# Patient Record
Sex: FEMALE | Race: WHITE | Employment: OTHER | URBAN - METROPOLITAN AREA
[De-identification: names, ages, dates, MRNs, and addresses within clinical notes are randomized per-mention and may not be internally consistent; named-entity substitution may affect disease eponyms.]

---

## 2017-08-23 ENCOUNTER — HOSPITAL ENCOUNTER (OUTPATIENT)
Dept: MAMMOGRAPHY | Facility: CLINIC | Age: 67
Discharge: HOME | End: 2017-08-23
Payer: MEDICARE

## 2017-08-23 ENCOUNTER — GENERIC CONVERSION - ENCOUNTER (OUTPATIENT)
Dept: OTHER | Facility: OTHER | Age: 67
End: 2017-08-23

## 2017-08-23 DIAGNOSIS — R92.8 OTHER ABNORMAL AND INCONCLUSIVE FINDINGS ON DIAGNOSTIC IMAGING OF BREAST: ICD-10-CM

## 2017-08-23 PROCEDURE — G0206 DX MAMMO INCL CAD UNI: HCPCS

## 2017-11-23 DIAGNOSIS — R92.8 OTHER ABNORMAL AND INCONCLUSIVE FINDINGS ON DIAGNOSTIC IMAGING OF BREAST: ICD-10-CM

## 2018-01-11 NOTE — RESULT NOTES
Verified Results  (Q) LIPID PANEL WITH DIRECT LDL [id number] 08:23AM [employer]     Test Name Result Flag Reference   CHOLESTEROL, TOTAL 291 mg/dL H 125-200   HDL CHOLESTEROL 49 mg/dL  > OR = 46   TRIGLICERIDES 275 mg/dL H <150   DIRECT  mg/dL H <130   Desirable range <100 mg/dL for patients with CHD or  diabetes and <70 mg/dL for diabetic patients with  known heart disease  CHOL/HDLC RATIO 5 9 (calc) H < OR = 5 0   NON HDL CHOLESTEROL 242 mg/dL (calc) H    Target for non-HDL cholesterol is 30 mg/dL higher than   LDL cholesterol target  (Q) COMPREHENSIVE METABOLIC PANEL W/O eGFR [id number] 08:23AM [employer]     Test Name Result Flag Reference   GLUCOSE 89 mg/dL  65-99   Fasting reference interval   UREA NITROGEN (BUN) 15 mg/dL  7-25   CREATININE 0 63 mg/dL  0 50-0 99   For patients >52years of age, the reference limit  for Creatinine is approximately 13% higher for people  identified as -American     BUN/CREATININE RATIO   1-20   NOT APPLICABLE (calc)   SODIUM 138 mmol/L  135-146   POTASSIUM 4 2 mmol/L  3 5-5 3   CHLORIDE 103 mmol/L     CARBON DIOXIDE 25 mmol/L  20-31   CALCIUM 9 6 mg/dL  8 6-10 4   PROTEIN, TOTAL 6 8 g/dL  6 1-8 1   ALBUMIN 4 2 g/dL  3 6-5 1   GLOBULIN 2 6 g/dL (calc)  1 9-3 7   ALBUMIN/GLOBULIN RATIO 1 6 (calc)  1 0-2 5   BILIRUBIN, TOTAL 0 4 mg/dL  0 2-1 2   ALKALINE PHOSPHATASE 112 U/L     AST 25 U/L  10-35   ALT 31 U/L H 6-29     (Q) TSH, 3RD GENERATION [id number] 08:23AM [employer]     Test Name Result Flag Reference   TSH 1 36 mIU/L  0 40-4 50     *(Q) VITAMIN D, 25-HYDROXY, LC/MS/MS [id number] 08:23AM [employer]   REPORT COMMENT:  FASTING:YES     Test Name Result Flag Reference   VITAMIN D, 25-OH, TOTAL 34 ng/mL     Vitamin D Status         25-OH Vitamin D:     Deficiency:                    <20 ng/mL  Insufficiency:             20 - 29 ng/mL  Optimal:                 > or = 30 ng/mL     For 25-OH Vitamin D testing on patients on D2-supplementation and patients for whom quantitation   of D2 and D3 fractions is required, the QuestAssureD(TM)  25-OH VIT D, (D2,D3), LC/MS/MS is recommended: order   code 45211 (patients >2yrs)  For more information on this test, go to:  [email or web address]/VZZ909  (This link is being provided for   informational/educational purposes only )

## 2018-01-11 NOTE — MISCELLANEOUS
Assessment    1  Ovary, torsion (620 5) (N83 51)   2  Ovarian mass (620 9) (N83 9)    Discussion/Summary  Discussion Summary:   She is recovering well from torsed ovary/ ovarian mass w removal - path benign Saw Gyn yesterday  Use meds as is  do TSH/ lipid BW when can   Call us as needed    Also - do mammo as prev discussed  Chief Complaint  Chief Complaint Free Text Note Form: ENRIQUETA- Ovarian Mass w torsion 7/21/16      History of Present Illness  TCM Communication [facility]: The patient is being contacted for follow-up after hospitalization  Hospital records were not available  She was hospitalized [facility]  The date of admission: 7/21/2016, date of discharge: 7/22/2016  Diagnosis: OVARIAN MASS, LAPAROSCOPIC SURGERY  She was discharged to home  Medications were not reviewed today  Follow-up appointments with other specialists: GYN  Symptoms: constipation and incisional pain  minor incisional pain   Communication performed and completed by EL GOLD [facility] RN   HPI: See hospital notes torsed right ovary w removal - did well - Feeling well - long term pain right low back actually resolved    Meds same  Never did BW from initial visit - has slip also never did Mammo - has slip      Review of Systems  Complete-Female:   Constitutional: as noted in HPI and no fever  Cardiovascular: No complaints of slow heart rate, no fast heart rate, no chest pain, no palpitations, no leg claudication, no lower extremity edema  Respiratory: No complaints of shortness of breath, no wheezing, no cough, no SOB on exertion, no orthopnea, no PND  Gastrointestinal: bowels ok, but as noted in HPI, no nausea, no vomiting and no blood in stools  Genitourinary: no dysuria  Neurological: no headache, no confusion, no dizziness and no fainting  Psychiatric: no anxiety and no depression  Hematologic/Lymphatic: No complaints of swollen glands, no swollen glands in the neck, does not bleed easily, does not bruise easily        Active Problems    1  Colon polyp (211 3) (K63 5)   2  Encounter for screening mammogram for breast cancer (V76 12) (Z12 31)   3  Hypercholesterolemia (272 0) (E78 0)   4  Hypothyroidism (244 9) (E03 9)   5  History of Total Hip Replacement Right   6  Vitamin D deficiency (268 9) (E55 9)    Surgical History    1  History of Complete Colonoscopy   2  History of Salpingo-oophorectomy   3  History of Total Hip Replacement Right   4  History of Tubal Ligation  Surgical History Reviewed: The surgical history was reviewed and updated today  Family History  Father    1  Family history of lung cancer (V16 1) (Z80 1)  Brother    2  Family history of leukemia (V16 6) (Z80 6)  Maternal Grandmother    3  Family history of Anxiety and depression  Paternal Grandfather    4  Family history of cardiac disorder (V17 49) (Z82 49)   5  Family history of cerebrovascular accident (CVA) (V17 1) (Z82 3)   6  Family history of hypertension (V17 49) (Z82 49)   7  Family history of substance abuse (V17 0) (Z81 4)    Social History    · Former smoker (V15 82) (V59 302)   · Full-time employment   ·    · No drug use   · Social drinker    Current Meds   1  Fish Oil 1200 MG Oral Capsule; Therapy: (Recorded:23Jun2016) to Recorded   2  Levothyroxine Sodium 50 MCG Oral Tablet; Take 1 tablet daily  Requested for:   [id number]; Last Rx:23Jun2016 Ordered   3  Multivitamins TABS; Therapy: (Recorded:23Jun2016) to Recorded   4  Vitamin D-3 1000 UNIT Oral Capsule; Uses 3 a day; Therapy: (Recorded:23Jun2016) to Recorded    Allergies    1  No Known Drug Allergies    Physical Exam    Constitutional   General appearance: No acute distress, well appearing and well nourished  Eyes   Conjunctiva and lids: No swelling, erythema or discharge  Pulmonary   Auscultation of lungs: Clear to auscultation  Cardiovascular   Auscultation of heart: Normal rate and rhythm, normal S1 and S2, without murmurs      Examination of extremities for edema and/or varicosities: Normal     Carotid pulses: Normal     Lymphatic   Palpation of lymph nodes in neck: No lymphadenopathy      Psychiatric   Orientation to person, place, and time: Normal     Mood and affect: Normal          Future Appointments    Date/Time Provider Specialty Site   09/06/2016 09:40 AM Lawson Jim MD Gastroenterology Adult [place] [address]   Electronically signed by : Davide Curiel, ; Jul 25 2016  4:55PM EST                       (Author)    Electronically signed by : Jose Morel, ; Jul 26 2016 10:23AM EST                       (Author)    Electronically signed by : Yudith Sampson DO; Aug  5 2016  2:03PM EST                       (Author)

## 2018-01-12 NOTE — RESULT NOTES
Verified Results  MAMMO DIAGNOSTIC LEFT W CAD [id number] 11:26AM Ana Alston Order Number: [id number]    - Patient Instructions: To schedule this appointment, please contact Central Scheduling at [phone]  Do not wear any perfume, powder, lotion or deodorant on breast or underarm area  Please bring your doctors order, referral (if needed) and insurance information with you on the day of the test  Failure to bring this information may result in this test being rescheduled  Arrive 15 minutes prior to your appointment time to register  On the day of your test, please bring any prior mammogram or breast studies with you that were not performed at a [facility]  Failure to bring prior exams may result in your test needing to be rescheduled  Test Name Result Flag Reference   MAMMO DIAGNOSTIC LEFT W CAD (Report)     Patient History:   Patient is postmenopausal    No known family history of cancer  Benign excisional biopsy of the right breast, 1994  Patient is a former smoker  Patient's BMI is 33 1  Reason for exam: additional evaluation requested from abnormal    screening  Scattered calcifications in the left breast     Mammo Diagnostic Left W CAD: November 18, 2016 - Check In #:    [de-identified]   CC with magnification, ML with magnification, and ML view(s) were   taken of the left breast      Technologist: RT Jhon(CRISPIN)(M)   Prior study comparison: October 19, 2016, bilateral screening    mammogram, performed at [facility]  There are scattered fibroglandular densities  These images were    obtained using digital technique and with the assistance of    Computer Aided Detection  There are no dominant masses, foci of    architectural distortion or suspicious clusters of calcification    to suggest malignancy  The visualized skin appears normal     Previously described calcifications are loosely clustered and    generally benign in morphology       Six-month magnification follow-up is recommended to assess    stability  ASSESSMENT: BiRad:3 - Probably Benign     Recommendation:   Follow-up diagnostic mammogram of the left breast in 6 months  Analyzed by CAD     8-10% of cancers will be missed on mammography  Management of a    palpable abnormality must be based on clinical grounds  Patients   will be notified of their results via letter from our facility  Accredited by [employer] and FDA       Transcription Location: [facility] 98: [id number]     Risk Value(s):   Tyrer-Cuzick 10 Year: 2 987%, Tyrer-Cuzipham Lifetime: 6 480%,    Myriad Table: 1 5%, MAURICE 5 Year: 1 7%, NCI Lifetime: 6 7%   Signed by:   Verlin Felty, MD   11/18/16

## 2018-01-14 NOTE — PROGRESS NOTES
Assessment    1  Encounter for preventive health examination (V70 0) (Z00 00)    Plan  Colon polyp    · COLONOSCOPY; Status:Active; Requested VVP:[id number];   Colon polyp, PMH: Complete Colonoscopy    · *1 - [facility] [place] Physician Referral  Consult   Status: Active - Retrospective Authorization  Requested for: [id number]  Care Summary provided  : Yes  Encounter for screening mammogram for breast cancer    · * MAMMO SCREENING BILATERAL W CAD; Status:Hold For - Scheduling; Requested  for:23Jun2016; Health Maintenance    · Jose Roberto WELCH, Edin Goel  (Dermatology) Physician Referral  Consult  Status: Hold For -  Scheduling  Requested for: [id number]  Care Summary provided  : Yes  Hypercholesterolemia    · (Q) COMPREHENSIVE METABOLIC PANEL W/O eGFR; Status:Active; Requested  CDX:[id number];    · (Q) LIPID PANEL WITH DIRECT LDL; Status:Active; Requested KDH:[id number];   Hypothyroidism    · Levothyroxine Sodium 50 MCG Oral Tablet; Take 1 tablet daily   · (Q) TSH, 3RD GENERATION; Status:Active; Requested JCU:[id number];    · *(Q) VITAMIN D, 25-HYDROXY, LC/MS/MS; Status:Active; Requested LMW:[id number];     Discussion/Summary  health maintenance visit the risks and benefits of cervical cancer screening were discussed set up w gyn Breast cancer screening: the risks and benefits of breast cancer screening were discussed and mammogram has been ordered  Colorectal cancer screening: the risks and benefits of colorectal cancer screening were discussed and colonoscopy has been ordered  Osteoporosis screening: heed Vit D prev low - on 3000 a day  Screening lab work includes glucose, lipid profile, thyroid function testing and 25-hydroxyvitamin D  The should do Zostavax (shingles shot) Do Flu shot yearly   She was advised to be evaluated by an optometrist and a dentist  Advice and education were given regarding nutrition, aerobic exercise, weight bearing exercise, vitamin D supplements, cardiovascular risk reduction, sunscreen use, self skin examination, helmet use and seat belt use  We reviewed health hx - she has been feeling well - heed BW - use med as is  Check here at least yearly  Continue w exercise, healthy diet  Chief Complaint  Initial visit/ Annual Exam      History of Present Illness  HPI: in as new pt - relates feels well overall - needs BW (last > 12 mo ago) On thyroid med for years    walks dog twice a day, swims cycles on weekends    Hx R THR - did well other joints ok      Review of Systems    Constitutional: no fever, not feeling poorly, no recent weight gain, no chills, not feeling tired and no recent weight loss  Eyes: no eyesight problems  ENT: no earache, no sore throat and no hoarseness  Cardiovascular: No complaints of slow heart rate, no fast heart rate, no chest pain, no palpitations, no leg claudication, no lower extremity edema  Respiratory: No complaints of shortness of breath, no wheezing, no cough, no SOB on exertion, no orthopnea, no PND  Gastrointestinal: occ loose bowel has had 3 colonoscopies polyps in past last 6 yrs ago occ loose, but no abdominal pain, no nausea, no vomiting and no blood in stools  Genitourinary: due for gyn, but no dysuria  Musculoskeletal: as noted in HPI  Integumentary: no rashes, no skin lesions and no skin wound  Neurological: no headache, no confusion, no dizziness, no limb weakness, no convulsions, no fainting and no difficulty walking  Psychiatric: no anxiety and no depression  Hematologic/Lymphatic: No complaints of swollen glands, no swollen glands in the neck, does not bleed easily, does not bruise easily        Surgical History    · History of Complete Colonoscopy   · History of Total Hip Replacement Right   · History of Tubal Ligation    Family History  Father    · Family history of lung cancer (V16 1) (Z80 1)  Paternal Grandfather    · Family history of cardiac disorder (V17 49) (Z80 55)    Social History    · Former smoker (V15 82) (Z87 891)   · light  quit 12 yrs ago   · Full-time employment   ·    ·    · No drug use   · Social drinker    Current Meds   1  Fish Oil 1200 MG Oral Capsule; Therapy: (Recorded:23Jun2016) to Recorded   2  Levothyroxine Sodium 50 MCG Oral Tablet; Take 1 tablet daily; Therapy: (Recorded:23Jun2016) to Recorded   3  Multivitamins TABS; Therapy: (Recorded:23Jun2016) to Recorded   4  Vitamin D-3 1000 UNIT Oral Capsule; Uses 3 a day; Therapy: (Recorded:23Jun2016) to Recorded    Allergies    1  No Known Drug Allergies    Vitals   Recorded: 10GKI2066 03:04PM   Heart Rate 68   Systolic 863   Diastolic 78   Height 5 ft 0 5 in   Weight 173 lb 4 00 oz   BMI Calculated 33 28   BSA Calculated 1 77     Physical Exam    Constitutional   General appearance: No acute distress, well appearing and well nourished  Head and Face   Head and face: Normal     Palpation of the face and sinuses: No sinus tenderness  Eyes   Conjunctiva and lids: No swelling, erythema or discharge  Ears, Nose, Mouth, and Throat   Otoscopic examination: Tympanic membranes translucent with normal light reflex  Canals patent without erythema  Oropharynx: Normal with no erythema, edema, exudate or lesions  Neck   Neck: Supple, symmetric, trachea midline, no masses  Thyroid: Normal, no thyromegaly  Pulmonary   Auscultation of lungs: Clear to auscultation  Cardiovascular   Auscultation of heart: Normal rate and rhythm, normal S1 and S2, no murmurs  Carotid pulses: 2+ bilaterally  Examination of extremities for edema and/or varicosities: Normal     Abdomen   Abdomen: Non-tender, no masses  Lymphatic   Palpation of lymph nodes in neck: No lymphadenopathy  Musculoskeletal   Gait and station: Normal     Stability: Normal     Muscle strength/tone: Normal     Neurologic   Cortical function: Normal mental status  Coordination: Normal finger to nose and heel to shin      Psychiatric   Judgment and insight: Normal     Orientation to person, place, and time: Normal     Recent and remote memory: Intact      Mood and affect: Normal        Signatures   Electronically signed by : Justo Sandhu DO; Jun 23 2016  4:21PM EST                       (Author)

## 2018-01-17 NOTE — RESULT NOTES
Verified Results  MAMMO DIAGNOSTIC LEFT W CAD [id number] 02:27PM Yoan Prophet Order Number: [id number]    - Patient Instructions: To schedule this appointment, please contact Central Scheduling at [phone]  Do not wear any perfume, powder, lotion or deodorant on breast or underarm area  Please bring your doctors order, referral (if needed) and insurance information with you on the day of the test  Failure to bring this information may result in this test being rescheduled  Arrive 15 minutes prior to your appointment time to register  On the day of your test, please bring any prior mammogram or breast studies with you that were not performed at a [facility]  Failure to bring prior exams may result in your test needing to be rescheduled  Test Name Result Flag Reference   MAMMO DIAGNOSTIC LEFT W CAD (Report)     Patient History:   Patient is postmenopausal    No known family history of cancer  Benign excisional biopsy of the right breast, 1994  Patient is a former smoker  Patient's BMI is 33 1  Reason for exam: follow-up at short interval from prior study  Patient presents for late six-month follow-up evaluation of    probably benign left breast microcalcifications  Mammo Diagnostic Left W CAD: August 23, 2017 - Check In #:    [de-identified]   CC, MLO, CC with magnification, and ML with magnification view(s)   were taken of the left breast      Technologist: Linda Bains RT(R)(M)   Prior study comparison: November 18, 2016, mammo diagnostic left    W CAD, performed at [address] [place] [address]  October 19, 2016, bilateral screening mammogram, [facility]  There are scattered fibroglandular densities  A few scattered    and grouped microcalcifications within the inner left breast are    stable  These do not have any suspicious features  No new    calcifications are seen  No masses are seen  No architectural    distortion   No skin thickening or nipple inversion  No axillary   adenopathy  Stable benign-appearing breast     ACR BI-RADSï¾® Assessments: BiRad:2 - Benign     Recommendation:   Routine screening mammogram of both breasts in 3 months  Back on   normal annual schedule   Analyzed by CAD     The patient is scheduled in a reminder system  8-10% of cancers will be missed on mammography  Management of a    palpable abnormality must be based on clinical grounds  Patients   will be notified of their results via letter from our facility  Accredited by [employer] and FDA       Transcription Location: [facility] 98: [id number]     Risk Value(s):   Tyrer-Cuzick 10 Year: 3 000%, Tyrer-Cuzick Lifetime: 6 200%,    Myriad Table: 1 5%, MAURICE 5 Year: 1 7%, NCI Lifetime: 6 5%   Signed by:   Gavin Coles MD   8/23/17

## 2019-02-27 ENCOUNTER — TELEPHONE (OUTPATIENT)
Dept: FAMILY MEDICINE CLINIC | Facility: CLINIC | Age: 69
End: 2019-02-27

## 2019-02-27 NOTE — TELEPHONE ENCOUNTER
----- Message from Kya High sent at 2/27/2019  2:18 PM EST -----  Regarding: RE: Schedule  Spoke w/ pt - they had an insurance change and had to switch family doctors  pcp removed   ----- Message -----  From: Jimmy Drew  Sent: 2/11/2019  12:24 PM  To:  Mario Villarreal MA  Subject: Schedule                                         Please attempt to call and schedule pt for AWV last seen 8/2016

## 2021-01-26 ENCOUNTER — TRANSCRIBE ORDERS (OUTPATIENT)
Dept: ADMINISTRATIVE | Facility: HOSPITAL | Age: 71
End: 2021-01-26

## 2021-01-26 DIAGNOSIS — Z12.31 SCREENING MAMMOGRAM FOR HIGH-RISK PATIENT: Primary | ICD-10-CM

## 2021-08-06 ENCOUNTER — OFFICE VISIT (OUTPATIENT)
Dept: OBGYN CLINIC | Facility: CLINIC | Age: 71
End: 2021-08-06
Payer: MEDICARE

## 2021-08-06 ENCOUNTER — APPOINTMENT (OUTPATIENT)
Dept: RADIOLOGY | Facility: AMBULARY SURGERY CENTER | Age: 71
End: 2021-08-06
Attending: ORTHOPAEDIC SURGERY
Payer: MEDICARE

## 2021-08-06 VITALS
SYSTOLIC BLOOD PRESSURE: 122 MMHG | BODY MASS INDEX: 33.04 KG/M2 | WEIGHT: 175 LBS | HEART RATE: 76 BPM | DIASTOLIC BLOOD PRESSURE: 78 MMHG | HEIGHT: 61 IN

## 2021-08-06 DIAGNOSIS — M19.171 POST-TRAUMATIC ARTHRITIS OF ANKLE, RIGHT: ICD-10-CM

## 2021-08-06 DIAGNOSIS — M19.171 POST-TRAUMATIC ARTHRITIS OF ANKLE, RIGHT: Primary | ICD-10-CM

## 2021-08-06 DIAGNOSIS — M19.071 OSTEOARTHRITIS OF RIGHT ANKLE, UNSPECIFIED OSTEOARTHRITIS TYPE: ICD-10-CM

## 2021-08-06 PROCEDURE — 73610 X-RAY EXAM OF ANKLE: CPT

## 2021-08-06 PROCEDURE — 99203 OFFICE O/P NEW LOW 30 MIN: CPT | Performed by: ORTHOPAEDIC SURGERY

## 2021-08-06 RX ORDER — CHLORHEXIDINE GLUCONATE 4 G/100ML
SOLUTION TOPICAL DAILY PRN
Status: CANCELLED | OUTPATIENT
Start: 2021-08-06

## 2021-08-06 NOTE — PROGRESS NOTES
GILL Bright  Attending, Orthopaedic Surgery  [facility] [address] [facility]        ORTHOPAEDIC FOOT AND ANKLE CLINIC VISIT     Assessment:     Encounter Diagnoses   Name Primary?  Osteoarthritis of right ankle, unspecified osteoarthritis type     Post-traumatic arthritis of ankle, right Yes              Plan:   · The patient verbalized understanding of exam findings and treatment plan  We engaged in the shared decision-making process and treatment options were discussed at length with the patient  Surgical and conservative management discussed today along with risks and benefits  · Patient has arthritis of her right ankle joint  · We discussed the multiple treatment options for ankle arthritis which involve NSAIDs, bracing, shoe modifications, injections and then either ankle replacement or ankle arthrodesis  · She saw Dr Elis Alston who had been treating her arthritis up to this point and referred her here for ankle replacement assessment  She is not interested in ankle fusion  · She is a good candidate for ankle replacement  We will plan on [place] ankle replacement in the OR at a mutually convenient rafy  · Obtain preoperative labs and medical assessment as directed  · Follow up 3 weeks post op    CONSENT FOR TOTAL ANKLE REPLACEMENT (TAR): Ankle replacement will be pursued and will be the planned procedure but we will also be prepared to perform an ankle and possibly simultaneous hindfoot arthrodesis should the quality of the bone and deformity not lend themselves to ankle replacement surgery  This will be an intraoperative decision  We have discussed that the deformity may warrant realignment of the foot even if ankle replacement can be performed and realignment would involve osteotomies and possible tendon transfers as well as tendo-Achilles lengthening   The patient understands that we may be able to perform any necessary associated procedures simultaneous to ankle replacement surgery but if there is any concern for the safety of the foot or if this is too much surgery at one time then these procedures may need to be performed in a staged manner  Patient understands that there is no guarantee that the surgery will relieve all of their pain and also understands that there will be a course of protected weight-bearing status required which will restrict them from driving and other activities as discussed at today's visit  Patient understands that there is no guarantee that ankle replacement surgery or ankle arthrodesis with or without simultaneous hindfoot arthrodesis will relieve all of his ankle and hindfoot symptoms and allow him to be fully functional  Patient recognizes that there are risks with surgery including bleeding, numbness, nerve irritation, wound complications, infection, continued pain, joint stiffness, malunion, nonunion, anesthetic complications, death, failure of procedure and possible need for further surgery  The patient understands that there is no guarantee that this surgery will relieve all of Her pain and symptoms  Patient understands that there is no guarantee that they will return to full function after the procedure  Patient has provided informed consent for the procedure  History of Present Illness:   Chief Complaint:   Chief Complaint   Patient presents with    Left Ankle - Pain     Nathalie Cosby is a 78 y o  female who is being seen for right ankle pain, patient has a history of several ankle sprains early in her life but denies any previous surgeries on her ankle  Pain is localized to the ankle joint with minimal radiating and described as sharp and moderate to severe  Patient denies numbness, tingling or radicular pain  Denies history of neuropathy  Patient does not smoke, does not have diabetes and does not take blood thinners    Patient denies family history of anesthesia complications and has not had any complications with anesthesia  Pain/symptom timing:  Worse during the day when active  Pain/symptom context:  Worse with activites and work  Pain/symptom modifying factors:  Rest makes better, activities make worse  Pain/symptom associated signs/symptoms: none    Prior treatment   · NSAIDsYes    · Injections No   · Bracing/Orthotics No   · Physical Therapy No     Orthopedic Surgical History:   As below    Past Medical, Surgical and Social History:  Past Medical History:  has a past medical history of Arthritis, Colon polyps, and Disease of thyroid gland  Problem List: does not have any pertinent problems on file  Past Surgical History:  has a past surgical history that includes Total hip arthroplasty (Right); Joint replacement; Tubal ligation; Colonoscopy; Salpingoophorectomy; Colonoscopy (N/A, 12/13/2016); and pr oophorectormy for [place], w/bx (Bilateral, 7/21/2016)  Family History: family history is not on file  Social History:  reports that she quit smoking about 15 years ago  Her smoking use included cigarettes  She has a 6 00 pack-year smoking history  She does not have any smokeless tobacco history on file  She reports current alcohol use  She reports current drug use  Drug: Marijuana  Current Medications: has a current medication list which includes the following prescription(s): bupropion hcl, vitamin d-3, levothyroxine, multivitamin adult, and fish oil  Allergies: has No Known Allergies       Review of Systems:  General- denies fever/chills  HEENT- denies hearing loss or sore throat  Eyes- denies eye pain or visual disturbances, denies red eyes  Respiratory- denies cough or SOB  Cardio- denies chest pain or palpitations  GI- denies abdominal pain  Endocrine- denies urinary frequency  Urinary- denies pain with urination  Musculoskeletal- Negative except noted above  Skin- denies rashes or wounds  Neurological- denies dizziness or headache  Psychiatric- denies anxiety or difficulty concentrating    Physical Exam:   BP 122/78   Pulse 76   Ht 5' 1" (1 549 m)   Wt 79 4 kg (175 lb)   BMI 33 07 kg/m²   General/Constitutional: No apparent distress: well-nourished and well developed  Eyes: normal ocular motion  Cardio: RRR, Normal S1S2, No m/r/g  Lymphatic: No appreciable lymphadenopathy  Respiratory: Non-labored breathing, CTA b/l no w/c/r  Vascular: No edema, swelling or tenderness, except as noted in detailed exam   Integumentary: No impressive skin lesions present, except as noted in detailed exam   Neuro: No ataxia or tremors noted  Psych: Normal mood and affect, oriented to person, place and time  Appropriate affect  Musculoskeletal: Normal, except as noted in detailed exam and in HPI  Examination    Right    Gait Antalgic   Musculoskeletal Tender to palpation at the medial and anterolateral aspects of the ankle    Skin Normal, moderate swelling of the ankle  Nails Normal    Range of Motion  5 degrees dorsiflexion, 40 degrees plantarflexion  Subtalar motion: normal    Stability Stable    Muscle Strength 5/5 tibialis anterior  5/5 gastrocnemius-soleus  5/5 posterior tibialis  4/5 peroneal/eversion strength  5/5 EHL  5/5 FHL    Neurologic Normal    Sensation  Intact to light touch throughout sural, saphenous, superficial peroneal, deep peroneal and medial/lateral plantar nerve distributions  Powers-Arthur 5 07 filament (10g) testing  deferred  Cardiovascular Brisk capillary refill < 2 seconds,intact DP and PT pulses    Special Tests None      Imaging Studies:   6 views of the right ankle were taken, reviewed and interpreted independently that demonstrate end-stage post-traumatic ankle arthritis with anterior subluxation of the talus under the tibia  Reviewed by me personally  Larry Landmark Lachman, MD  Foot & Ankle Surgery   Department [address]      I personally performed the service  Larry Landmark Lachman, MD

## 2021-08-24 ENCOUNTER — TELEPHONE (OUTPATIENT)
Dept: OBGYN CLINIC | Facility: HOSPITAL | Age: 71
End: 2021-08-24

## 2021-08-24 NOTE — TELEPHONE ENCOUNTER
I healthy patient undergoing elective foot and ankle surgery, routine labs are typically not necessary 
Patient is calling to find out why there is no blood work ordered for pre-op  Patient is having sx on 9/13/21  She went to her pcp yesterday & they also wondered       [phone]
None

## 2021-09-07 RX ORDER — IBUPROFEN 400 MG/1
TABLET ORAL EVERY 6 HOURS PRN
COMMUNITY
End: 2021-09-14 | Stop reason: HOSPADM

## 2021-09-07 NOTE — PRE-PROCEDURE INSTRUCTIONS
Pre-Surgery Instructions:   Medication Instructions    buPROPion HCl (WELLBUTRIN PO) Instructed patient per Anesthesia Guidelines  take am of sx    Cholecalciferol (VITAMIN D-3) 1000 UNITS CAPS Instructed patient per Anesthesia Guidelines  hold am of sx    ibuprofen (MOTRIN) 400 mg tablet Instructed patient per Anesthesia Guidelines  hold 3 days preop    levothyroxine 50 mcg tablet Instructed patient per Anesthesia Guidelines  take am of sx    Multiple Vitamins-Minerals (MULTIVITAMIN ADULT) TABS Instructed patient per Anesthesia Guidelines  hold preop    You will receive a phone call from hospital for arrival time  Please call surgeons office if any changes in your condition  Wear easy on/off clothing; consider type of surgery;  Valuables, jewelry, piercing's please keep at home  **COVID-19  education/surgical guidelines      Please: No contact lenses or eye make up, artificial eyelashes    Please bring special knee scooter if overnight  Please secure transportation     Follow pre surgery showering or cleaning instructions as  Reviewed by nurse or surgeons office      Questions answered and concerns addressed

## 2021-09-11 ENCOUNTER — ANESTHESIA EVENT (OUTPATIENT)
Dept: PERIOP | Facility: HOSPITAL | Age: 71
End: 2021-09-11
Payer: MEDICARE

## 2021-09-12 NOTE — ANESTHESIA PREPROCEDURE EVALUATION
Procedure:  ARTHROPLASTY ANKLE (Right Ankle)    Relevant Problems   Other   (+) Disease of thyroid gland      BMI 33  Arthritis  Depression,anxiety          Physical Exam    Airway    Mallampati score: II  TM Distance: >3 FB  Neck ROM: full     Dental   No notable dental hx     Cardiovascular      Pulmonary      Other Findings        Anesthesia Plan  ASA Score- 2     Anesthesia Type- general with ASA Monitors  Additional Monitors:   Airway Plan: ETT  Comment: GA with ETT, IV, antiemetics  Right popliteal/adductor canal blocks for postop pain control as requested by surgeon  Plan Factors-    Chart reviewed  Patient summary reviewed  Patient is not a current smoker  Patient did not smoke on day of surgery  Induction- intravenous  Postoperative Plan-   Planned trial extubation    Informed Consent- Anesthetic plan and risks discussed with patient  I personally reviewed this patient with the CRNA  Discussed and agreed on the Anesthesia Plan with the CRNA  Lauren Cohen

## 2021-09-13 ENCOUNTER — APPOINTMENT (OUTPATIENT)
Dept: RADIOLOGY | Facility: HOSPITAL | Age: 71
End: 2021-09-13
Payer: MEDICARE

## 2021-09-13 ENCOUNTER — HOSPITAL ENCOUNTER (OUTPATIENT)
Facility: HOSPITAL | Age: 71
Setting detail: OUTPATIENT SURGERY
Discharge: HOME | End: 2021-09-14
Attending: ORTHOPAEDIC SURGERY | Admitting: ORTHOPAEDIC SURGERY
Payer: MEDICARE

## 2021-09-13 ENCOUNTER — ANESTHESIA (OUTPATIENT)
Dept: PERIOP | Facility: HOSPITAL | Age: 71
End: 2021-09-13
Payer: MEDICARE

## 2021-09-13 DIAGNOSIS — M19.171 POST-TRAUMATIC ARTHRITIS OF RIGHT ANKLE: Primary | ICD-10-CM

## 2021-09-13 PROCEDURE — G9197 ORDER FOR CEPH: HCPCS | Performed by: ORTHOPAEDIC SURGERY

## 2021-09-13 PROCEDURE — C1713 ANCHOR/SCREW BN/BN,TIS/BN: HCPCS | Performed by: ORTHOPAEDIC SURGERY

## 2021-09-13 PROCEDURE — C1776 JOINT DEVICE (IMPLANTABLE): HCPCS | Performed by: ORTHOPAEDIC SURGERY

## 2021-09-13 PROCEDURE — 27702 RECONSTRUCT ANKLE JOINT: CPT | Performed by: ORTHOPAEDIC SURGERY

## 2021-09-13 PROCEDURE — C1769 GUIDE WIRE: HCPCS | Performed by: ORTHOPAEDIC SURGERY

## 2021-09-13 PROCEDURE — C9290 INJ, BUPIVACAINE LIPOSOME: HCPCS | Performed by: ORTHOPAEDIC SURGERY

## 2021-09-13 PROCEDURE — 73610 X-RAY EXAM OF ANKLE: CPT

## 2021-09-13 DEVICE — IMPLANTABLE DEVICE
Type: IMPLANTABLE DEVICE | Site: ANKLE | Status: FUNCTIONAL
Brand: VANTAGE

## 2021-09-13 DEVICE — 4.0MM CANNULATED SCREW-SHORT THREAD 60MM: Type: IMPLANTABLE DEVICE | Site: ANKLE | Status: FUNCTIONAL

## 2021-09-13 RX ORDER — OXYCODONE HYDROCHLORIDE 5 MG/1
5 TABLET ORAL EVERY 4 HOURS PRN
Status: DISCONTINUED | OUTPATIENT
Start: 2021-09-13 | End: 2021-09-14 | Stop reason: HOSPADM

## 2021-09-13 RX ORDER — OXYCODONE HYDROCHLORIDE 5 MG/1
5 TABLET ORAL EVERY 4 HOURS PRN
Qty: 30 TABLET | Refills: 0 | Status: SHIPPED | OUTPATIENT
Start: 2021-09-13 | End: 2021-09-18

## 2021-09-13 RX ORDER — CEFAZOLIN SODIUM 2 G/50ML
2000 SOLUTION INTRAVENOUS EVERY 8 HOURS
Status: DISCONTINUED | OUTPATIENT
Start: 2021-09-13 | End: 2021-09-14 | Stop reason: HOSPADM

## 2021-09-13 RX ORDER — EPHEDRINE SULFATE 50 MG/ML
INJECTION INTRAVENOUS AS NEEDED
Status: DISCONTINUED | OUTPATIENT
Start: 2021-09-13 | End: 2021-09-13

## 2021-09-13 RX ORDER — DEXAMETHASONE SODIUM PHOSPHATE 10 MG/ML
INJECTION, SOLUTION INTRAMUSCULAR; INTRAVENOUS AS NEEDED
Status: DISCONTINUED | OUTPATIENT
Start: 2021-09-13 | End: 2021-09-13

## 2021-09-13 RX ORDER — ACETAMINOPHEN 325 MG/1
650 TABLET ORAL EVERY 6 HOURS PRN
Status: DISCONTINUED | OUTPATIENT
Start: 2021-09-13 | End: 2021-09-14 | Stop reason: HOSPADM

## 2021-09-13 RX ORDER — MIDAZOLAM HYDROCHLORIDE 2 MG/2ML
INJECTION, SOLUTION INTRAMUSCULAR; INTRAVENOUS AS NEEDED
Status: DISCONTINUED | OUTPATIENT
Start: 2021-09-13 | End: 2021-09-13

## 2021-09-13 RX ORDER — NEOSTIGMINE METHYLSULFATE 1 MG/ML
INJECTION INTRAVENOUS AS NEEDED
Status: DISCONTINUED | OUTPATIENT
Start: 2021-09-13 | End: 2021-09-13

## 2021-09-13 RX ORDER — BUPIVACAINE HYDROCHLORIDE 2.5 MG/ML
INJECTION, SOLUTION EPIDURAL; INFILTRATION; INTRACAUDAL
Status: COMPLETED | OUTPATIENT
Start: 2021-09-13 | End: 2021-09-13

## 2021-09-13 RX ORDER — OXYCODONE HYDROCHLORIDE 5 MG/1
10 TABLET ORAL EVERY 4 HOURS PRN
Status: DISCONTINUED | OUTPATIENT
Start: 2021-09-13 | End: 2021-09-14 | Stop reason: HOSPADM

## 2021-09-13 RX ORDER — SODIUM CHLORIDE, SODIUM LACTATE, POTASSIUM CHLORIDE, CALCIUM CHLORIDE 600; 310; 30; 20 MG/100ML; MG/100ML; MG/100ML; MG/100ML
50 INJECTION, SOLUTION INTRAVENOUS CONTINUOUS
Status: DISCONTINUED | OUTPATIENT
Start: 2021-09-13 | End: 2021-09-14 | Stop reason: HOSPADM

## 2021-09-13 RX ORDER — LEVOTHYROXINE SODIUM 0.03 MG/1
50 TABLET ORAL DAILY
Status: DISCONTINUED | OUTPATIENT
Start: 2021-09-14 | End: 2021-09-14 | Stop reason: HOSPADM

## 2021-09-13 RX ORDER — FENTANYL CITRATE/PF 50 MCG/ML
25 SYRINGE (ML) INJECTION
Status: DISCONTINUED | OUTPATIENT
Start: 2021-09-13 | End: 2021-09-13 | Stop reason: HOSPADM

## 2021-09-13 RX ORDER — VANCOMYCIN HYDROCHLORIDE 1 G/20ML
INJECTION, POWDER, LYOPHILIZED, FOR SOLUTION INTRAVENOUS AS NEEDED
Status: DISCONTINUED | OUTPATIENT
Start: 2021-09-13 | End: 2021-09-13 | Stop reason: HOSPADM

## 2021-09-13 RX ORDER — SODIUM CHLORIDE, SODIUM LACTATE, POTASSIUM CHLORIDE, CALCIUM CHLORIDE 600; 310; 30; 20 MG/100ML; MG/100ML; MG/100ML; MG/100ML
INJECTION, SOLUTION INTRAVENOUS CONTINUOUS PRN
Status: DISCONTINUED | OUTPATIENT
Start: 2021-09-13 | End: 2021-09-13

## 2021-09-13 RX ORDER — ONDANSETRON 4 MG/1
4 TABLET, FILM COATED ORAL EVERY 8 HOURS PRN
Qty: 20 TABLET | Refills: 0 | Status: SHIPPED | OUTPATIENT
Start: 2021-09-13

## 2021-09-13 RX ORDER — HYDROMORPHONE HCL IN WATER/PF 6 MG/30 ML
0.2 PATIENT CONTROLLED ANALGESIA SYRINGE INTRAVENOUS
Status: DISCONTINUED | OUTPATIENT
Start: 2021-09-13 | End: 2021-09-14 | Stop reason: HOSPADM

## 2021-09-13 RX ORDER — CEFAZOLIN SODIUM 1 G/50ML
1000 SOLUTION INTRAVENOUS ONCE
Status: DISCONTINUED | OUTPATIENT
Start: 2021-09-13 | End: 2021-09-14 | Stop reason: HOSPADM

## 2021-09-13 RX ORDER — SODIUM CHLORIDE, SODIUM LACTATE, POTASSIUM CHLORIDE, CALCIUM CHLORIDE 600; 310; 30; 20 MG/100ML; MG/100ML; MG/100ML; MG/100ML
125 INJECTION, SOLUTION INTRAVENOUS CONTINUOUS
Status: DISCONTINUED | OUTPATIENT
Start: 2021-09-13 | End: 2021-09-14 | Stop reason: HOSPADM

## 2021-09-13 RX ORDER — ONDANSETRON 2 MG/ML
4 INJECTION INTRAMUSCULAR; INTRAVENOUS ONCE AS NEEDED
Status: COMPLETED | OUTPATIENT
Start: 2021-09-13 | End: 2021-09-13

## 2021-09-13 RX ORDER — ONDANSETRON 2 MG/ML
4 INJECTION INTRAMUSCULAR; INTRAVENOUS EVERY 6 HOURS PRN
Status: DISCONTINUED | OUTPATIENT
Start: 2021-09-13 | End: 2021-09-14 | Stop reason: HOSPADM

## 2021-09-13 RX ORDER — GLYCOPYRROLATE 0.2 MG/ML
INJECTION INTRAMUSCULAR; INTRAVENOUS AS NEEDED
Status: DISCONTINUED | OUTPATIENT
Start: 2021-09-13 | End: 2021-09-13

## 2021-09-13 RX ORDER — ROCURONIUM BROMIDE 10 MG/ML
INJECTION, SOLUTION INTRAVENOUS AS NEEDED
Status: DISCONTINUED | OUTPATIENT
Start: 2021-09-13 | End: 2021-09-13

## 2021-09-13 RX ORDER — LIDOCAINE HYDROCHLORIDE 10 MG/ML
INJECTION, SOLUTION EPIDURAL; INFILTRATION; INTRACAUDAL; PERINEURAL AS NEEDED
Status: DISCONTINUED | OUTPATIENT
Start: 2021-09-13 | End: 2021-09-13

## 2021-09-13 RX ORDER — BUPROPION HYDROCHLORIDE 150 MG/1
150 TABLET ORAL DAILY
Status: DISCONTINUED | OUTPATIENT
Start: 2021-09-13 | End: 2021-09-14 | Stop reason: HOSPADM

## 2021-09-13 RX ORDER — FENTANYL CITRATE 50 UG/ML
INJECTION, SOLUTION INTRAMUSCULAR; INTRAVENOUS AS NEEDED
Status: DISCONTINUED | OUTPATIENT
Start: 2021-09-13 | End: 2021-09-13

## 2021-09-13 RX ORDER — CHLORHEXIDINE GLUCONATE 4 G/100ML
SOLUTION TOPICAL DAILY PRN
Status: DISCONTINUED | OUTPATIENT
Start: 2021-09-13 | End: 2021-09-14 | Stop reason: HOSPADM

## 2021-09-13 RX ORDER — HYDROMORPHONE HCL/PF 1 MG/ML
0.5 SYRINGE (ML) INJECTION
Status: DISCONTINUED | OUTPATIENT
Start: 2021-09-13 | End: 2021-09-13 | Stop reason: HOSPADM

## 2021-09-13 RX ORDER — PROPOFOL 10 MG/ML
INJECTION, EMULSION INTRAVENOUS CONTINUOUS PRN
Status: DISCONTINUED | OUTPATIENT
Start: 2021-09-13 | End: 2021-09-13

## 2021-09-13 RX ORDER — DEXMEDETOMIDINE HYDROCHLORIDE 100 UG/ML
INJECTION, SOLUTION INTRAVENOUS AS NEEDED
Status: DISCONTINUED | OUTPATIENT
Start: 2021-09-13 | End: 2021-09-13

## 2021-09-13 RX ORDER — ONDANSETRON 2 MG/ML
INJECTION INTRAMUSCULAR; INTRAVENOUS AS NEEDED
Status: DISCONTINUED | OUTPATIENT
Start: 2021-09-13 | End: 2021-09-13

## 2021-09-13 RX ORDER — ASPIRIN 325 MG
325 TABLET ORAL 2 TIMES DAILY
Status: DISCONTINUED | OUTPATIENT
Start: 2021-09-13 | End: 2021-09-14 | Stop reason: HOSPADM

## 2021-09-13 RX ORDER — CEFAZOLIN SODIUM 2 G/50ML
2000 SOLUTION INTRAVENOUS ONCE
Status: COMPLETED | OUTPATIENT
Start: 2021-09-13 | End: 2021-09-13

## 2021-09-13 RX ORDER — PROPOFOL 10 MG/ML
INJECTION, EMULSION INTRAVENOUS AS NEEDED
Status: DISCONTINUED | OUTPATIENT
Start: 2021-09-13 | End: 2021-09-13

## 2021-09-13 RX ORDER — ASPIRIN 325 MG
325 TABLET, DELAYED RELEASE (ENTERIC COATED) ORAL 2 TIMES DAILY
Qty: 84 TABLET | Refills: 0 | Status: SHIPPED | OUTPATIENT
Start: 2021-09-13

## 2021-09-13 RX ADMIN — BUPIVACAINE 5 ML: 13.3 INJECTION, SUSPENSION, LIPOSOMAL INFILTRATION at 11:19

## 2021-09-13 RX ADMIN — HYDROMORPHONE HYDROCHLORIDE 0.5 MG: 1 INJECTION, SOLUTION INTRAMUSCULAR; INTRAVENOUS; SUBCUTANEOUS at 14:11

## 2021-09-13 RX ADMIN — MIDAZOLAM 2 MG: 1 INJECTION INTRAMUSCULAR; INTRAVENOUS at 11:00

## 2021-09-13 RX ADMIN — HYDROMORPHONE HYDROCHLORIDE 0.5 MG: 1 INJECTION, SOLUTION INTRAMUSCULAR; INTRAVENOUS; SUBCUTANEOUS at 14:42

## 2021-09-13 RX ADMIN — ONDANSETRON 4 MG: 2 INJECTION INTRAMUSCULAR; INTRAVENOUS at 15:04

## 2021-09-13 RX ADMIN — PROPOFOL 100 MCG/KG/MIN: 10 INJECTION, EMULSION INTRAVENOUS at 13:05

## 2021-09-13 RX ADMIN — DEXAMETHASONE SODIUM PHOSPHATE 4 MG: 10 INJECTION, SOLUTION INTRAMUSCULAR; INTRAVENOUS at 12:31

## 2021-09-13 RX ADMIN — HYDROMORPHONE HYDROCHLORIDE 0.5 MG: 1 INJECTION, SOLUTION INTRAMUSCULAR; INTRAVENOUS; SUBCUTANEOUS at 13:52

## 2021-09-13 RX ADMIN — LIDOCAINE HYDROCHLORIDE 50 MG: 10 INJECTION, SOLUTION EPIDURAL; INFILTRATION; INTRACAUDAL; PERINEURAL at 11:45

## 2021-09-13 RX ADMIN — ONDANSETRON 4 MG: 2 INJECTION INTRAMUSCULAR; INTRAVENOUS at 12:31

## 2021-09-13 RX ADMIN — CEFAZOLIN SODIUM 2000 MG: 2 SOLUTION INTRAVENOUS at 11:45

## 2021-09-13 RX ADMIN — FENTANYL CITRATE 25 MCG: 50 INJECTION INTRAMUSCULAR; INTRAVENOUS at 13:38

## 2021-09-13 RX ADMIN — ASPIRIN 325 MG ORAL TABLET 325 MG: 325 PILL ORAL at 17:52

## 2021-09-13 RX ADMIN — BUPIVACAINE HYDROCHLORIDE 15 ML: 2.5 INJECTION, SOLUTION EPIDURAL; INFILTRATION; INTRACAUDAL at 11:18

## 2021-09-13 RX ADMIN — BUPIVACAINE HYDROCHLORIDE 10 ML: 2.5 INJECTION, SOLUTION EPIDURAL; INFILTRATION; INTRACAUDAL; PERINEURAL at 11:08

## 2021-09-13 RX ADMIN — ROCURONIUM BROMIDE 40 MG: 10 INJECTION, SOLUTION INTRAVENOUS at 11:45

## 2021-09-13 RX ADMIN — FENTANYL CITRATE 50 MCG: 50 INJECTION, SOLUTION INTRAMUSCULAR; INTRAVENOUS at 11:45

## 2021-09-13 RX ADMIN — GLYCOPYRROLATE 0.4 MG: 0.2 INJECTION, SOLUTION INTRAMUSCULAR; INTRAVENOUS at 13:02

## 2021-09-13 RX ADMIN — BUPIVACAINE 15 ML: 13.3 INJECTION, SUSPENSION, LIPOSOMAL INFILTRATION at 11:08

## 2021-09-13 RX ADMIN — CEFAZOLIN SODIUM 2000 MG: 2 SOLUTION INTRAVENOUS at 19:53

## 2021-09-13 RX ADMIN — FENTANYL CITRATE 50 MCG: 50 INJECTION, SOLUTION INTRAMUSCULAR; INTRAVENOUS at 11:00

## 2021-09-13 RX ADMIN — SODIUM CHLORIDE, SODIUM LACTATE, POTASSIUM CHLORIDE, AND CALCIUM CHLORIDE: .6; .31; .03; .02 INJECTION, SOLUTION INTRAVENOUS at 13:05

## 2021-09-13 RX ADMIN — OXYCODONE HYDROCHLORIDE 5 MG: 5 TABLET ORAL at 17:53

## 2021-09-13 RX ADMIN — SODIUM CHLORIDE, SODIUM LACTATE, POTASSIUM CHLORIDE, AND CALCIUM CHLORIDE 50 ML/HR: .6; .31; .03; .02 INJECTION, SOLUTION INTRAVENOUS at 17:53

## 2021-09-13 RX ADMIN — FENTANYL CITRATE 25 MCG: 50 INJECTION INTRAMUSCULAR; INTRAVENOUS at 13:33

## 2021-09-13 RX ADMIN — ACETAMINOPHEN 650 MG: 325 TABLET, FILM COATED ORAL at 19:53

## 2021-09-13 RX ADMIN — GLYCOPYRROLATE 0.2 MG: 0.2 INJECTION, SOLUTION INTRAMUSCULAR; INTRAVENOUS at 11:57

## 2021-09-13 RX ADMIN — EPHEDRINE SULFATE 5 MG: 50 INJECTION, SOLUTION INTRAVENOUS at 11:54

## 2021-09-13 RX ADMIN — NEOSTIGMINE METHYLSULFATE 3 MG: 1 INJECTION INTRAVENOUS at 13:02

## 2021-09-13 RX ADMIN — DEXMEDETOMIDINE 12 MCG: 100 INJECTION, SOLUTION, CONCENTRATE INTRAVENOUS at 12:19

## 2021-09-13 RX ADMIN — SODIUM CHLORIDE, SODIUM LACTATE, POTASSIUM CHLORIDE, AND CALCIUM CHLORIDE: .6; .31; .03; .02 INJECTION, SOLUTION INTRAVENOUS at 11:39

## 2021-09-13 RX ADMIN — PROPOFOL 200 MG: 10 INJECTION, EMULSION INTRAVENOUS at 11:45

## 2021-09-13 RX ADMIN — OXYCODONE HYDROCHLORIDE 10 MG: 5 TABLET ORAL at 21:57

## 2021-09-13 NOTE — ANESTHESIA PROCEDURE NOTES
Peripheral Block    Patient location during procedure: pre-op  Start time: 9/13/2021 11:08 AM  Reason for block: procedure for pain, at surgeon's request and post-op pain management  Staffing  Performed: Anesthesiologist   Anesthesiologist: Yelena Barker MD  Preanesthetic Checklist  Completed: patient identified, IV checked, site marked, risks and benefits discussed, surgical consent, monitors and equipment checked, pre-op evaluation and timeout performed  Peripheral Block  Patient position: prone  Prep: ChloraPrep  Patient monitoring: heart rate, cardiac monitor, continuous pulse ox and frequent blood pressure checks  Block type: popliteal  Laterality: right  Injection technique: single-shot  Procedures: ultrasound guided, Ultrasound guidance required for the procedure to increase accuracy and safety of medication placement and decrease risk of complications  Ultrasound permanent image savedbupivacaine (MARCAINE) 0 25 % perineural infiltration, 10 mL  Needle  Needle type: Stimuplex   Needle gauge: 26 G  Needle length: 4    Needle localization: ultrasound guidance  Needle insertion depth: 3 cm  Test dose: negative  Assessment  Injection assessment: incremental injection, local visualized surrounding nerve on ultrasound and no paresthesia on injection  Paresthesia pain: none  Heart rate change: no  Slow fractionated injection: yes  Post-procedure:  site cleaned  patient tolerated the procedure well with no immediate complications  Additional Notes  Right popliteal nerve block ultrasound guidance  exparel 15 cc, 10 cc 0 25% bupivacaine

## 2021-09-13 NOTE — ANESTHESIA POSTPROCEDURE EVALUATION
Post-Op Assessment Note    CV Status:  Stable  Pain Score: 0    Pain management: adequate     Mental Status:  Alert and awake   Hydration Status:  Euvolemic   PONV Controlled:  Controlled   Airway Patency:  Patent      Post Op Vitals Reviewed: Yes      Staff: CRNA         No complications documented      /76 (09/13/21 1327)    Temp 98 2 °F (36 8 °C) (09/13/21 1327)    Pulse 82 (09/13/21 1327)   Resp 18 (09/13/21 1327)    SpO2 96 % (09/13/21 1327)

## 2021-09-13 NOTE — ANESTHESIA PROCEDURE NOTES
Peripheral Block    Patient location during procedure: pre-op  Start time: 9/13/2021 11:18 AM  Reason for block: procedure for pain, at surgeon's request and post-op pain management  Staffing  Performed: Anesthesiologist   Anesthesiologist: Angela Lehman MD  Preanesthetic Checklist  Completed: patient identified, IV checked, site marked, risks and benefits discussed, surgical consent, monitors and equipment checked, pre-op evaluation and timeout performed  Peripheral Block  Patient position: supine  Prep: ChloraPrep  Patient monitoring: heart rate, cardiac monitor, continuous pulse ox and frequent blood pressure checks  Block type: adductor canal block  Laterality: right  Injection technique: single-shot  Procedures: ultrasound guided, Ultrasound guidance required for the procedure to increase accuracy and safety of medication placement and decrease risk of complications  Ultrasound permanent image savedbupivacaine (MARCAINE) 0 25 % perineural infiltration, 15 mL  Needle  Needle type: Stimuplex   Needle gauge: 26 G  Needle length: 4    Needle localization: ultrasound guidance  Needle insertion depth: 2 cm  Test dose: negative  Assessment  Injection assessment: local visualized surrounding nerve on ultrasound and no paresthesia on injection  Paresthesia pain: none  Heart rate change: no  Slow fractionated injection: yes  Post-procedure:  site cleaned  patient tolerated the procedure well with no immediate complications  Additional Notes  Right adductor canal block ultrasound guidance  5 cc exparel, 15 cc 0 25% bupivacaine

## 2021-09-13 NOTE — PLAN OF CARE
Problem: PAIN - ADULT  Goal: Verbalizes/displays adequate comfort level or baseline comfort level  Description: Interventions:  - Encourage patient to monitor pain and request assistance  - Assess pain using appropriate pain scale  - Administer analgesics based on type and severity of pain and evaluate response  - Implement non-pharmacological measures as appropriate and evaluate response  - Consider cultural and social influences on pain and pain management  - Notify physician/advanced practitioner if interventions unsuccessful or patient reports new pain  Outcome: Progressing     Problem: INFECTION - ADULT  Goal: Absence or prevention of progression during hospitalization  Description: INTERVENTIONS:  - Assess and monitor for signs and symptoms of infection  - Monitor lab/diagnostic results  - Monitor all insertion sites, i e  indwelling lines, tubes, and drains  - Monitor endotracheal if appropriate and nasal secretions for changes in amount and color  - [place] appropriate cooling/warming therapies per order  - Administer medications as ordered  - Instruct and encourage patient and family to use good hand hygiene technique  - Identify and instruct in appropriate isolation precautions for identified infection/condition  Outcome: Progressing  Goal: Absence of fever/infection during neutropenic period  Description: INTERVENTIONS:  - Monitor WBC    Outcome: Progressing     Problem: SAFETY ADULT  Goal: Patient will remain free of falls  Description: INTERVENTIONS:  - Educate patient/family on patient safety including physical limitations  - Instruct patient to call for assistance with activity   - Consult OT/PT to assist with strengthening/mobility   - Keep Call bell within reach  - Keep bed low and locked with side rails adjusted as appropriate  - Keep care items and personal belongings within reach  - Initiate and maintain comfort rounds  - Make Fall Risk Sign visible to staff  - Offer Toileting every 2 Hours, in advance of need  - Initiate/Maintain alarm  - Obtain necessary fall risk management equipment:   - Apply yellow socks and bracelet for high fall risk patients  - Consider moving patient to room near nurses station  Outcome: Progressing  Goal: Maintain or return to baseline ADL function  Description: INTERVENTIONS:  -  Assess patient's ability to carry out ADLs; assess patient's baseline for ADL function and identify physical deficits which impact ability to perform ADLs (bathing, care of mouth/teeth, toileting, grooming, dressing, etc )  - Assess/evaluate cause of self-care deficits   - Assess range of motion  - Assess patient's mobility; develop plan if impaired  - Assess patient's need for assistive devices and provide as appropriate  - Encourage maximum independence but intervene and supervise when necessary  - Involve family in performance of ADLs  - Assess for home care needs following discharge   - Consider OT consult to assist with ADL evaluation and planning for discharge  - Provide patient education as appropriate  Outcome: Progressing  Goal: Maintains/Returns to pre admission functional level  Description: INTERVENTIONS:  - Perform BMAT or MOVE assessment daily    - Set and communicate daily mobility goal to care team and patient/family/caregiver     - Collaborate with rehabilitation services on mobility goals if consulted  - Out of bed for toileting  - Record patient progress and toleration of activity level   Outcome: Progressing     Problem: DISCHARGE PLANNING  Goal: Discharge to home or other facility with appropriate resources  Description: INTERVENTIONS:  - Identify barriers to discharge w/patient and caregiver  - Arrange for needed discharge resources and transportation as appropriate  - Identify discharge learning needs (meds, wound care, etc )  - Arrange for interpretive services to assist at discharge as needed  - Refer to Case Management Department for coordinating discharge planning if the patient needs post-hospital services based on physician/advanced practitioner order or complex needs related to functional status, cognitive ability, or social support system  Outcome: Progressing     Problem: Knowledge Deficit  Goal: Patient/family/caregiver demonstrates understanding of disease process, treatment plan, medications, and discharge instructions  Description: Complete learning assessment and assess knowledge base    Interventions:  - Provide teaching at level of understanding  - Provide teaching via preferred learning methods  Outcome: Progressing

## 2021-09-13 NOTE — INTERVAL H&P NOTE
H&P reviewed  After examining the patient I find no changes in the patients condition since the H&P had been written  Vitals:    09/13/21 0945   BP: 135/79   Pulse: 59   Resp: 19   Temp: (!) 97 2 °F (36 2 °C)   SpO2: 96%     Right ankle replacement

## 2021-09-13 NOTE — DISCHARGE INSTRUCTIONS
Searcy Sandifer, M D  Attending, [address] [place] [address] Office Phone: [phone] ? Fax: [phone]  [facility] Office Phone: [phone] ? SGJ:[phone]    : Domo Rice, [address] [facility]     Surgery Coordinators Jhonatan Wellington: Criselda Riojas, [address] [facility], [phone]  Surgery Coordinator Bud:  Rito Graham, [phone]                                                             Willie Allen, [phone]  www [email or web address]   PRE-OPERATIVE AND POST-OPERATIVE INSTRUCTIONS    General Information:   Typical post operative visits are at the following intervals:  2-3 weeks post surgery, 6 weeks post surgery, 3 months post surgery, 6 months post surgery, and then on a yearly basis  However, this may change based on Dr Guerda Delgado recommendation   #1 post-operative rule for foot/ankle surgery:  ONCE YOU ARE OUT OF YOUR CAST AND/OR REMOVABLE BOOT, SWELLING MAY PERSIST FOR MANY MONTHS  YOU MIGHT ALSO EXPERIENCE A BLUISH DISCOLORATION OF YOUR LEG  THIS IS NORMAL AND PART OF THE USUAL POSTOPERATIVE EXPERIENCE    DO NOT WAIT UNTIL YOUR BLOCK WEARS OFF TO TAKE YOUR PAIN MEDICATION  IT TAKES A FEW DOSES OF THE PAIN MEDICATION TO REACH A THERAPEUTIC LEVEL  TAKE A TABLET PROACTIVELY BEFORE YOU HAVE ANY PAIN AND AGAIN 4 HOURS LATER SO WHEN THE BLOCK WEARS OFF, YOU ARE NOT CAUGHT OFF GUARD  SMOKING:   Smoking results in incomplete healing of fractures (broken bones) and joints that my have been fused  Smoking and nicotine also prevents the growth of bone into ankle replacements and bone healing  It also slows the healing of muscles and skin (soft tissue)  Therefore, please do not have surgery if you continue to smoke  We reserve the right to cancel your surgery if we suspect that you are smoking  DO NOT use nicorette gum or other patches    Please find an alternative method to quit smoking before your surgery and do not restart after surgery to allow for healing  THREE RULES:    1  After surgery you will most likely be given the instructions KEEP YOUR TOES ABOVE YOUR NOSE    This means that you MUST have your feet elevated higher than your heart  Keeping your toes above your nose helps to heal the muscles and skin (soft tissues) by reducing swelling in your leg  This position also helps to prevent infection, and is very important in avoiding deep venous thrombosis (blood clots)  2  In order to keep the blood circulating in your legs and in order to avoid deep vein   thrombosis (blood clots), we ask patients to GET UP ONCE AN HOUR during the day  This means you should at least cross the room and come back  It does not mean you have to be up for long periods of time  In most cases we will not have people immediately put any weight on their operated part  This is important to prevent loosening of metal or other devices holding the bones together  It also prevents irritation of the soft tissues which can lead to prolonged healing  When we say get up once an hour, please walk, hop or move with an assisted device  This is important! 3  Do not do any excessive walking during the first few days after surgery  Recovering from surgery is a full-time task for the patient  Postoperative care is important to avoid irritating the skin incision, which can lead to infection  Please do not plan activities or go out of town for several weeks after surgery  AFTER YOUR SURGERY:   Bleeding through the bandage almost always occurs  Do not let this alarm you  Simply add more gauze or a towel, call us, and come in for a dressing change  If you think it is excessive, contact us immediately or go to the local emergency room   Do not get the bandage wet  Showering is possible with plastic protectors  Be very careful, as the bathroom can be wet and slippery    If you do get your dressing wet, it should be changed immediately  Please contact us   ONCE YOUR ARE OUT OF YOUR CAST AND/OR REMOVABLE BOOT, SWELLING MAY PERSIST FOR MANY MONTHS  THERE WILL ALSO BE A BLUISH DISCOLORATION OF YOUR LEG FOR MONTHS  THIS IS NORMAL AND PART OF THE USUAL POSTOPERATIVE EXPERIENCE  WEARING COMPRESSION HOSE (ELASTIC STOCKINGS) CAN HELP AVOID SOME OF THIS SWELLING   Ice the area 20 minutes every hour once the nerve block wears off  If you are in a cast or a splint, you may need to leave the ice on longer than 20 minutes in order to feel any benefits  DRESSING:   The purpose of the surgical dressing is to keep your wound and the surgical site protected from the environment  Most dressings contain splints, which help to hold your foot and ankle in a corrected position, and also allow the surgical site to heal properly  If you have a drain in place, this will need to be removed in 1 day after surgery  The time for the drain to be pulled will be written on your discharge instruction sheet  CAST  INSTRUCTIONS:  You may or may not get a cast following surgery  If you do, pay close attention to the following:     After application of a splint or cast, it is very important to elevate your leg for 24 to 72 hours  The injured area should be elevated well above the heart  Remember Toes above your Nose  Rest and elevation greatly reduce pain and speed the healing process by minimizing early swelling      CALL YOUR DOCTORS OFFICE OR VISIT LOCATION EMERGENCY ROOM IF YOU HAVE ANY OF THE FOLLOWING:     Significant increased pain, which may be caused by swelling (Strict elevation will alleviate this)   Numbness and tingling in your hand or foot, which may be caused by too much pressure on the nerves (There is always some numbness after surgery due to nerve blocks)   Burning and stinging, which may be caused by too much pressure on the skin   Excessive swelling below the cast, which may mean the cast is slowing your blood circulation   Loss of active movement of toes, which request an urgent evaluation   Loss of capillary refill  Pinch the tip of toes and izzy the skin  Release pressure and if the skin does not return pink then call the office immediately  DO NOT GET YOUR CAST WET  Bacteria thrive in moist dark areas  We do not want this  If your cast becomes wet, return to the office and we will apply another one  PAIN AFTER SURGERY:  Narcotic pain medication can and will depress your respiratory system if taken in excess  The goal of pain management with narcotics is to be comfortable not pain free  If you take enough narcotics to be pain free then you run the risk of stopping breathing  If this happens, call 911 immediately!  Pain in the heel is often  caused by pressure from the weight of your foot on the bed  Make sure your heel is suspended off the bed by keeping a pillow underneath your calf not your knee  Medications: You will be given narcotic pain medication  Do NOT drive while taking narcotic medications  Medications such as Darvocet, Percocet, Vicoden or Tylenol #3, also contain acetaminophen (Tylenol)  Do not take acetaminophen or Tylenol from home when taking theses medications  When you fill your prescription, you may ask the pharmacist if your pain medication has acetaminophen/Tylenol in it  It is okay to take Tylenol with Oxycontin/Oxycodone  Unless you are allergic to aspirin or currently taking a blood thinner, Dr Placido Suarez patients are requested to take one 325 mg aspirin every 12 hours until you are back to walking normally after surgery (This can be up to 6 weeks)  Ecotrin (Enteric-coated aspirin) is more sensitive to the stomach and we recommend purchasing this instead of regular aspirin to minimize the risk of stomach irritation  Narcotic medications commonly cause nausea  Taking them with food will decrease this side effect   If you are having extreme nausea, please contact us for an alternative medication or for something that can be taken with this medication to decrease the nausea  Also, narcotic medications frequently cause constipation  An increase of fiber, fruits and vegetables in your diet may alleviate this problem, or if necessary, you may use an over-the-counter medication such as senekot, colace, or Fibercon for constipation problems  You should resume all medications you were taking prior to the surgery unless otherwise specified  If you had fracture surgery, bony surgery like an osteotomy or fusion, or a surgery that requires bone healing, you are advised to take Vitamin D and Calcium to improve healing potential   Vitamin D3 4000 units/day and Calcium 1200mg/day  These are over the counter medications so please pick them up at the pharmacy when you are picking up your prescriptions  Activity:   Because of your recent foot surgery, your activity level will decrease  You will need to elevate your foot ABOVE the level of your heart for a minimum of four days  The length of time necessary for the swelling to go down, and for your wounds to heal properly depends greatly on your efforts here  Elevation is extremely important to avoid compromising the blood supply to your foot  Remember when your foot is down it will swell, which will increase pain and slow healing  Wiggle your toes frequently if possible  If you go home with a regional block, (a type of anesthesia) the foot and leg will be numb  Think of ways to get into your house and around the house until the block wears off  Keep in mind that it may be a legal issue if you drive while in a cast or splint, especially when the splint is on the right foot  You may call the Department of Motor Vehicles to schedule a road test if you have adaptive equipment applied to your car     The amount of weight you are allowed to bear on your foot will be written on your discharge sheet filled out at the time of surgery  The following is an explanation of the possibilities:     Weight bearing as tolerated (WBAT)   You may put your body weight on your foot as long as you tolerate the pain

## 2021-09-14 VITALS
DIASTOLIC BLOOD PRESSURE: 63 MMHG | WEIGHT: 175 LBS | HEIGHT: 61 IN | HEART RATE: 77 BPM | BODY MASS INDEX: 33.04 KG/M2 | TEMPERATURE: 97.9 F | SYSTOLIC BLOOD PRESSURE: 138 MMHG | RESPIRATION RATE: 18 BRPM | OXYGEN SATURATION: 93 %

## 2021-09-14 PROCEDURE — 97166 OT EVAL MOD COMPLEX 45 MIN: CPT

## 2021-09-14 PROCEDURE — 97162 PT EVAL MOD COMPLEX 30 MIN: CPT

## 2021-09-14 PROCEDURE — 99024 POSTOP FOLLOW-UP VISIT: CPT | Performed by: PHYSICIAN ASSISTANT

## 2021-09-14 RX ADMIN — ONDANSETRON 4 MG: 2 INJECTION INTRAMUSCULAR; INTRAVENOUS at 06:51

## 2021-09-14 RX ADMIN — OXYCODONE HYDROCHLORIDE 10 MG: 5 TABLET ORAL at 05:16

## 2021-09-14 RX ADMIN — CEFAZOLIN SODIUM 2000 MG: 2 SOLUTION INTRAVENOUS at 05:14

## 2021-09-14 NOTE — PROGRESS NOTES
Yolanda Roca  73 y o   female  MR#: [id number]  9/14/2021    Post-op days: 1  Extremity: right ankle    Subjective:  Patient seen and examined  Lying comfortably in bed  She has gotten up multiple times overnight to use the bathroom  She has been using the crutch scooter  She has not attempted to use the walker yet  Pain is well under control  She denies any chest pain, shortness of breath, fevers or chills  Vitals:   Vitals:    09/14/21 0215   BP: 136/62   Pulse: 68   Resp: 16   Temp: 97 7 °F (36 5 °C)   SpO2:        Exam:   A&O x 3 NAD  Right LE:  SLC intact with no evidence of loosening or skin breakdown  Dressing intact over proximal tibia with minimal sanguinous drainage  Able to actively move toes  Decreased sensation to light touch over great toe  Capillary refill brisk    Labs:   WBC No results for input(s): WBC in the last 72 hours  H/H No results for input(s): HGB in the last 72 hours /No results for input(s): HCT in the last 72 hours  Sed Rate No results for input(s): SEDRATE in the last 72 hours  CRP No results for input(s): CRP in the last 72 hours  Assessment:   S/p right total ankle arthroplasty    Plan:   WBAT to RLE with walker  Patient has cast shoe  Pain control  DVT prophylaxis  mg BID  Will discharge home with same medication  PT/OT  DC planning  Plan for discharge to home today  Follow up in office with Dr Timoteo Sanchez as scheduled

## 2021-09-14 NOTE — PLAN OF CARE
Problem: PHYSICAL THERAPY ADULT  Goal: Performs mobility at highest level of function for planned discharge setting  See evaluation for individualized goals  Description: Treatment/Interventions: ADL retraining, Functional transfer training, LE strengthening/ROM, Endurance training, Therapeutic exercise, Equipment eval/education, Patient/family training, Bed mobility, Gait training, Compensatory technique education, Continued evaluation, Spoke to nursing, Spoke to case management, OT          See flowsheet documentation for full assessment, interventions and recommendations  Note: Prognosis: Good  Problem List: Decreased strength, Decreased range of motion, Decreased endurance, Impaired balance, Decreased mobility, Pain, Decreased skin integrity, Orthopedic restrictions  Assessment: Pt is a 72 y/o female who has a hx of post traumatic arthritis of R ankle  9/13/21:  R ankle arthroplasty, short leg cast with surgical shoe, WBAT  Pt currently presents with decreased balance, and mobility, need for AD; Pt prefers knee scooter over RW secondary to mental block of not wanting to put R foot on floor after surgery despite WBAT orders  Pt has difficulty turning in tight spaces with knee walker but able to demo safe ambulation short distance with RW in tight spaces;  Educated pt to use RW when in bathroom for safety  Pt would benefit from continued PT while in hospital to increase strength, balance, endurance, independence with funcitonal mobility to return to PLOF  PT/OT co-evaluation for pt's current medical status, recent surgery, mobility/balance deficits requiring 2 skilled therapists  Pt remains reclined in chair at bedside with BLE elevated;  Needs in reach; The patient's AM-PAC Basic Mobility Inpatient Short Form Raw Score is 23, Standardized Score is 50  88   A standardized score greater than 42 9 suggests the patient may benefit from [facility] however 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 PT/OT as she has resource at home  Please also refer to the recommendation of the Physical Therapist for safe discharge planning  PT Discharge Recommendation: Home with home health rehabilitation ( is a retired [address] , pt may refuse need for [facility] PT)     PT - OK to Discharge: Yes (when medically ready)    See flowsheet documentation for full assessment

## 2021-09-14 NOTE — NURSING NOTE
Reviewed dc christophe with pt and  present  Vitals wnl and pt stable prior to discharge   Pt left via wheelchair and picked up by

## 2021-09-14 NOTE — OCCUPATIONAL THERAPY NOTE
Occupational Therapy Evaluation      Cody Benavides    9/14/2021    Principal Problem:    Post-traumatic arthritis of right ankle      Past Medical History:   Diagnosis Date    Arthritis     Colon polyps     Depression     Disease of thyroid gland        Past Surgical History:   Procedure Laterality Date    COLONOSCOPY      COLONOSCOPY N/A 12/13/2016    Procedure: COLONOSCOPY;  Surgeon: Rosalba Gabriel MD;  Location: [facility] GI LAB;   Service:     FOOT SURGERY Left     JOINT REPLACEMENT      total right hip    TN ARTHROPLASTY ANKLE JOINT Right 9/13/2021    Procedure: ARTHROPLASTY ANKLE;  Surgeon: Caroline Valadez MD;  Location: [facility] MAIN OR;  Service: Orthopedics     [address], W/BX Bilateral 7/21/2016    Procedure: LAPAROSCOPIC SALPINGO-OOPHORECTOMY / RESECTION OF TORSED RIGHT PELVIC MASS;  Surgeon: Kaycee Guevara MD;  Location: [facility] MAIN OR;  Service: Gynecology Oncology    SALPINGOOPHORECTOMY      TOTAL HIP ARTHROPLASTY Right     TUBAL LIGATION          09/14/21 0900   OT Last Visit   OT Visit Date 09/14/21   Note Type   Note type Evaluation   Restrictions/Precautions   Weight Bearing Precautions Per Order Yes   RLE Weight Bearing Per Order WBAT   Braces or Orthoses   (surgical shoe)   Pain Assessment   Pain Assessment Tool Tyson-Baker FACES   Tyson-Baker FACES Pain Rating 2   Pain Location/Orientation Orientation: Right;Location: Foot   Home Living   Type of [address] [facility] One level  (0STE)   Home Equipment Walker  (knee scooter)   Prior Function   Level of [place] Independent with ADLs and functional mobility   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)   Receives Help From [address] [place] [address] ([address]) [address]  7  Independent   Grooming Assistance 7  Independent   UB Bathing Assistance 7  Independent   LB Bathing Assistance 4  [address]   [address] Supervision/Setup   LB Dressing Assistance 4  Minimal Assistance   Toileting Assistance  5  Supervision/Setup   Bed Mobility   Supine to Sit 6  Modified independent   Transfers   Sit to Stand 5  Supervision   Stand to Sit 5  Supervision   Stand pivot 5  Supervision   Functional Mobility   Functional Mobility 5  Supervision   Additional Comments with knee scooter/ RW  Recommend RW for toileting due to small space  Activity Tolerance   Activity Tolerance Patient tolerated treatment well   RUE Assessment   RUE Assessment WFL   LUE Assessment   LUE Assessment WFL   Cognition   Overall Cognitive Status WFL   Arousal/Participation Alert; Cooperative   Attention Within functional limits   Orientation Level Oriented X4   Memory Within functional limits   Following Commands Follows one step commands without difficulty   Comments Required some cues for safe management   Assessment   Limitation Decreased ADL status; Decreased self-care trans;Decreased high-level ADLs   Prognosis Good   Assessment Pt is a 73 y o  female seen for OT evaluation at [address] [place] [address], admitted 9/13/2021 w/ Post-traumatic arthritis of right ankle  OT completed expanded review of pt's medical and social history  Personal factors affecting pt at time of IE include:difficulty performing ADLS, difficulty performing IADLS  and health management   Prior to admission, pt was living in [place], [place] with spouse and was independent with ADL/IADL  Upon evaluation, pt presents to OT below baseline due to the following performance deficits: decreased balance, impulsivity and orthopedic restrictions  Pt to benefit from continued skilled OT tx while in the hospital to address deficits as defined above and maximize level of functional independence w ADL's and functional mobility  Occupational Performance areas to address include: bathing/shower, toilet hygiene, dressing, functional mobility, household maintenance and functional transfers   The patient's raw score on the AM-PAC Daily Activity inpatient short form is 21, standardized score is 44 27, greater than 39 4  Patients at this level are likely to benefit from DC to home  Based on findings, pt is of moderate complexity  At this time, OT recommendations at time of discharge are return home with home OT & support from spouse  Goals   Patient Goals go home   Plan   Treatment Interventions ADL retraining;Functional transfer training;Patient/family training;Equipment evaluation/education; Compensatory technique education;Continued evaluation; Energy conservation   Goal Expiration Date 09/24/21   OT Frequency 2-3x/wk   Recommendation   OT Discharge Recommendation Home with home health rehabilitation  (& family support)   OT - OK to Discharge Yes   AM-PAC Daily Activity Inpatient   Lower Body Dressing 3   Bathing 3   Toileting 3   Upper Body Dressing 4   Grooming 4   Eating 4   Daily Activity Raw Score 21   Daily Activity Standardized Score (Calc for Raw Score >=11) 44 27   AM-[facility] Applied Cognition Inpatient   Following a Speech/Presentation 4   Understanding Ordinary Conversation 4   Taking Medications 4   Remembering Where Things Are Placed or Put Away 4   Remembering List of 4-5 Errands 4   Taking Care of Complicated Tasks 4   Applied Cognition Raw Score 24   Applied Cognition Standardized Score 62 21     Pt will achieve the following goals within 10 days  *Pt will complete LB bathing and dressing with supervision   *Pt will complete toileting (hygiene and clothing management) with modified independence  *Pt will perform functional transfers with modified independence in order to complete ADL routine  *Pt will increase standing tolerance to 10 minutes in order to complete ADL routine  *Pt will complete item retrieval and light home management with supervision while demonstrating good safety  *Pt will demonstrate increased activity tolerance in order to complete ADL routine      Ricardo Wakefield MS Nyla, OTR/L

## 2021-09-14 NOTE — PHYSICAL THERAPY NOTE
PHYSICAL THERAPY Evaluation    Physical Therapy Evaluation    Performed at least 2 patient identifiers during session:  Patient Active Problem List   Diagnosis    Disease of thyroid gland    Pelvic pain    Ovarian cyst, complex    Post-traumatic arthritis of right ankle       Past Medical History:   Diagnosis Date    Arthritis     Colon polyps     Depression     Disease of thyroid gland        Past Surgical History:   Procedure Laterality Date    COLONOSCOPY      COLONOSCOPY N/A 12/13/2016    Procedure: COLONOSCOPY;  Surgeon: Maisha Alfaro MD;  Location: [facility];   Service:     FOOT SURGERY Left     JOINT REPLACEMENT      total right hip    [place] ARTHROPLASTY ANKLE JOINT Right 9/13/2021    Procedure: ARTHROPLASTY ANKLE;  Surgeon: Jerrell Brantley MD;  Location:  MAIN OR;  Service: Orthopedics     [address], W/BX Bilateral 7/21/2016    Procedure: LAPAROSCOPIC SALPINGO-OOPHORECTOMY / RESECTION OF TORSED RIGHT PELVIC MASS;  Surgeon: Cici Pinzon MD;  Location:  MAIN OR;  Service: Gynecology Oncology    SALPINGOOPHORECTOMY      TOTAL HIP ARTHROPLASTY Right     TUBAL LIGATION            09/14/21 0901   PT Last Visit   PT Visit Date 09/14/21   Note Type   Note type Evaluation   Pain Assessment   Pain Assessment Tool Tyson-Baker FACES   Tyson-Baker FACES Pain Rating 2   Pain Location/Orientation Orientation: Right  (ankle/foot)   Home Living   Type of [address] [place] [address] One level  (0 KIM)   Home Equipment Walker  (knee scooter)   Prior Function   Level of [place] Independent with ADLs and functional mobility   Lives With Spouse  ( is a retired [facility])   [place] Help From Family   ADL Assistance Independent   IADLs Independent   Restrictions/Precautions   [employer] Bearing Precautions Per Order Yes   RLE [place] Bearing Per Order WBAT   Braces or Orthoses   (R short leg cast, surgical shoe donned)   General   Additional Pertinent History Pt prefers knee scooter over RW secondary to mental block of putting weight bearing through new surgery  Family/Caregiver Present No   Cognition   Overall Cognitive Status WFL   Arousal/Participation Alert   Orientation Level Oriented X4   Memory Within functional limits   Following Commands Follows one step commands without difficulty   RUE Assessment   RUE Assessment WFL   LUE Assessment   LUE Assessment WFL   RLE Assessment   RLE Assessment   (WFL at hip and knee, ankle not tested;  short cast)   LLE Assessment   LLE Assessment WFL   Bed Mobility   Supine to Sit 6  Modified independent   Transfers   Sit to Stand 5  Supervision   Additional items   (knee scooter)   Stand to Sit 5  Supervision   Additional items   (knee scooter)   Stand pivot 5  Supervision   Additional items   (knee scooter)   Additional Comments pt with difficulty making tight turns with knee scooter;  pt educated on use of RW for tight spaces (bathroom, kitchen) to decrease need of knee scooter   Ambulation/Elevation   Gait Assistance 5  Supervision   Additional items Assist x 1   Assistive Device Rolling walker  (knee scooter)   Distance 50ft with knee scooter; pt with difficulty making tight turns, 12ft with RW in/out of bathroom, WBAT; no LOB or unsteadiness with RW  Pt prefers knee scooter   Balance   Static Sitting Fair   Dynamic Sitting Fair   Static Standing Fair   Dynamic Standing Fair   Ambulatory Fair   Activity Tolerance   Activity Tolerance   (no adverse effects to PT noted)   Assessment   Prognosis Good   Problem List Decreased strength;Decreased range of motion;Decreased endurance; Impaired balance;Decreased mobility;Pain;Decreased skin integrity;Orthopedic restrictions   Assessment Pt is a 74 y/o female who has a hx of post traumatic arthritis of R ankle  9/13/21:  R ankle arthroplasty, short leg cast with surgical shoe, WBAT    Pt currently presents with decreased balance, and mobility, need for AD; Pt prefers knee scooter over RW secondary to mental block of not wanting to put R foot on floor after surgery despite WBAT orders  Pt has difficulty turning in tight spaces with knee walker but able to demo safe ambulation short distance with RW in tight spaces;  Educated pt to use RW when in bathroom for safety  Pt would benefit from continued PT while in hospital to increase strength, balance, endurance, independence with funcitonal mobility to return to PLOF  PT/OT co-evaluation for pt's current medical status, recent surgery, mobility/balance deficits requiring 2 skilled therapists  Pt remains reclined in chair at bedside with BLE elevated;  Needs in reach; The patient's AM-PAC Basic Mobility Inpatient Short Form Raw Score is 23, Standardized Score is 50  88  A standardized score greater than 42 9 suggests the patient may benefit from discharge to home with [facility] PT however 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 and pt may refuse the need for [facility] PT/OT as she has resource at home  Please also refer to the recommendation of the Physical Therapist for safe discharge planning  Goals   Patient Goals to go home   STG Expiration Date 09/21/21   Short Term Goal #1 1   mod I for sit to/from standing with RW to normalize gait;  2   mod I to ambulate 150ft with RW, RLE WBAT to normalize gait and decrease risk of immobility and weakness in RLE during recovery   Plan   Treatment/Interventions ADL retraining;Functional transfer training;LE strengthening/ROM; Endurance training; Therapeutic exercise;Equipment eval/education;Patient/family training;Bed mobility;Gait training; Compensatory technique education;Continued evaluation;Spoke to nursing;Spoke to case management;OT   PT Frequency 5x/wk   Recommendation   PT Discharge Recommendation Home with home health rehabilitation  ( is a retired [address] , pt may refuse need for [facility] PT)   PT - OK to Discharge Yes  (when medically ready)   AM-PAC Basic Mobility Inpatient   Turning in Bed Without Bedrails 4   Lying on Back to Sitting on Edge of Flat Bed 4   Moving Bed to Chair 4   Standing Up From Chair 4   Walk in Room 4   Climb 3-5 Stairs 3   Basic Mobility Inpatient Raw Score 23   Basic Mobility Standardized Score 50 88     Cristo Farooq PT      Patient Name: Renetta Cordoba  MWHWL'V Date: 9/14/2021

## 2021-09-20 ENCOUNTER — TELEPHONE (OUTPATIENT)
Dept: OBGYN CLINIC | Facility: HOSPITAL | Age: 71
End: 2021-09-20

## 2021-09-20 DIAGNOSIS — M19.171 POST-TRAUMATIC ARTHRITIS OF ANKLE, RIGHT: Primary | ICD-10-CM

## 2021-09-20 RX ORDER — OXYCODONE HYDROCHLORIDE 5 MG/1
5 TABLET ORAL EVERY 4 HOURS PRN
Qty: 20 TABLET | Refills: 0 | Status: SHIPPED | OUTPATIENT
Start: 2021-09-20 | End: 2021-09-25

## 2021-09-20 NOTE — TELEPHONE ENCOUNTER
Patient had rt ankle sx on 9/13/21 & has some concerns  Patient is stating that she is still in a lot of pain  She has pain level of 8 or 9/10 when not medicated  When medicated she is a 4/10  Patient would like to know if she can get a refill of oxycodone 5mg  She only has 1 left at this time & will be taking it at 10:30am  Patient is taking as prescirbed  Patient would like that sent to [facility] on [address] in [place]  Please call patient with any questions at [phone]  Also, please let her know when that is sent in

## 2021-09-27 ENCOUNTER — TELEPHONE (OUTPATIENT)
Dept: OBGYN CLINIC | Facility: HOSPITAL | Age: 71
End: 2021-09-27

## 2021-09-27 NOTE — TELEPHONE ENCOUNTER
Patient sees Dr Ceci Sorensen  She called and left a voicemail asking if it is normal to continue to have a  pins and needles feeling in her foot?

## 2021-09-27 NOTE — TELEPHONE ENCOUNTER
I spoke with patient and information above relayed  She states she feels more pins and needles when she is elevating the leg, she feels less pins and needles when she is up and around  She did have some back issues years ago after having her children  The pins and needles are thigh to back of knee and also at the foot  She is not taking tylenol as it was not helping, started taking ibuprofen  Advised that Dr  Does not recommend ibuprofen or naproxen due to bone healing  Add the Tylenol 1000mg BID and continue ASA 325mg BID, Ice 30 min on 30 min off, toes above the nose 23 hrs per day  Appt scheduled for Friday for PO  She is requesting refill on the oxycodone

## 2021-09-27 NOTE — TELEPHONE ENCOUNTER
Yes this pins and needle sensation can be normal after surgery  This is likely due to post-operative swelling irritating the nerves

## 2021-09-28 DIAGNOSIS — M19.171 POST-TRAUMATIC ARTHRITIS OF ANKLE, RIGHT: Primary | ICD-10-CM

## 2021-09-28 RX ORDER — OXYCODONE HYDROCHLORIDE 5 MG/1
5 TABLET ORAL EVERY 4 HOURS PRN
Qty: 10 TABLET | Refills: 0 | Status: SHIPPED | OUTPATIENT
Start: 2021-09-28 | End: 2021-10-03

## 2021-10-01 ENCOUNTER — OFFICE VISIT (OUTPATIENT)
Dept: OBGYN CLINIC | Facility: CLINIC | Age: 71
End: 2021-10-01

## 2021-10-01 VITALS — HEIGHT: 61 IN | BODY MASS INDEX: 33.04 KG/M2 | WEIGHT: 175 LBS

## 2021-10-01 DIAGNOSIS — M19.171 POST-TRAUMATIC ARTHRITIS OF ANKLE, RIGHT: Primary | ICD-10-CM

## 2021-10-01 PROCEDURE — 99024 POSTOP FOLLOW-UP VISIT: CPT | Performed by: ORTHOPAEDIC SURGERY

## 2021-10-12 ENCOUNTER — TELEPHONE (OUTPATIENT)
Dept: OBGYN CLINIC | Facility: HOSPITAL | Age: 71
End: 2021-10-12

## 2021-10-12 ENCOUNTER — OFFICE VISIT (OUTPATIENT)
Dept: OBGYN CLINIC | Facility: CLINIC | Age: 71
End: 2021-10-12
Payer: MEDICARE

## 2021-10-12 VITALS
SYSTOLIC BLOOD PRESSURE: 143 MMHG | WEIGHT: 175 LBS | HEART RATE: 60 BPM | HEIGHT: 61 IN | BODY MASS INDEX: 33.04 KG/M2 | DIASTOLIC BLOOD PRESSURE: 80 MMHG

## 2021-10-12 DIAGNOSIS — M19.171 POST-TRAUMATIC ARTHRITIS OF ANKLE, RIGHT: Primary | ICD-10-CM

## 2021-10-12 PROCEDURE — 99024 POSTOP FOLLOW-UP VISIT: CPT | Performed by: ORTHOPAEDIC SURGERY

## 2021-10-12 PROCEDURE — 29425 APPL SHORT LEG CAST WALKING: CPT | Performed by: ORTHOPAEDIC SURGERY

## 2021-10-16 ENCOUNTER — HOSPITAL ENCOUNTER (EMERGENCY)
Facility: HOSPITAL | Age: 71
Discharge: HOME | End: 2021-10-16
Attending: EMERGENCY MEDICINE | Admitting: EMERGENCY MEDICINE
Payer: MEDICARE

## 2021-10-16 ENCOUNTER — NURSE TRIAGE (OUTPATIENT)
Dept: OTHER | Facility: OTHER | Age: 71
End: 2021-10-16

## 2021-10-16 VITALS
RESPIRATION RATE: 20 BRPM | TEMPERATURE: 97.9 F | SYSTOLIC BLOOD PRESSURE: 141 MMHG | OXYGEN SATURATION: 95 % | DIASTOLIC BLOOD PRESSURE: 83 MMHG | HEART RATE: 88 BPM

## 2021-10-16 DIAGNOSIS — Z47.89 AFTERCARE FOR CAST OR SPLINT CHECK OR CHANGE: Primary | ICD-10-CM

## 2021-10-16 PROCEDURE — 99284 EMERGENCY DEPT VISIT MOD MDM: CPT | Performed by: EMERGENCY MEDICINE

## 2021-10-16 PROCEDURE — 99283 EMERGENCY DEPT VISIT LOW MDM: CPT

## 2021-10-16 RX ORDER — OXYCODONE HYDROCHLORIDE 5 MG/1
5 TABLET ORAL EVERY 4 HOURS PRN
Qty: 12 TABLET | Refills: 0 | Status: SHIPPED | OUTPATIENT
Start: 2021-10-16

## 2021-10-16 RX ORDER — OXYCODONE HYDROCHLORIDE AND ACETAMINOPHEN 5; 325 MG/1; MG/1
1 TABLET ORAL ONCE
Status: COMPLETED | OUTPATIENT
Start: 2021-10-16 | End: 2021-10-16

## 2021-10-16 RX ADMIN — OXYCODONE HYDROCHLORIDE AND ACETAMINOPHEN 1 TABLET: 5; 325 TABLET ORAL at 18:47

## 2021-10-18 ENCOUNTER — TELEPHONE (OUTPATIENT)
Dept: OBGYN CLINIC | Facility: HOSPITAL | Age: 71
End: 2021-10-18

## 2021-10-19 ENCOUNTER — APPOINTMENT (OUTPATIENT)
Dept: RADIOLOGY | Facility: AMBULARY SURGERY CENTER | Age: 71
End: 2021-10-19
Attending: ORTHOPAEDIC SURGERY
Payer: MEDICARE

## 2021-10-19 ENCOUNTER — OFFICE VISIT (OUTPATIENT)
Dept: OBGYN CLINIC | Facility: CLINIC | Age: 71
End: 2021-10-19

## 2021-10-19 VITALS — HEIGHT: 61 IN | BODY MASS INDEX: 33.04 KG/M2 | WEIGHT: 175 LBS

## 2021-10-19 DIAGNOSIS — M19.171 POST-TRAUMATIC ARTHRITIS OF ANKLE, RIGHT: Primary | ICD-10-CM

## 2021-10-19 DIAGNOSIS — M19.171 POST-TRAUMATIC ARTHRITIS OF ANKLE, RIGHT: ICD-10-CM

## 2021-10-19 PROCEDURE — 1124F ACP DISCUSS-NO DSCNMKR DOCD: CPT | Performed by: ORTHOPAEDIC SURGERY

## 2021-10-19 PROCEDURE — 73610 X-RAY EXAM OF ANKLE: CPT

## 2021-10-19 PROCEDURE — 99024 POSTOP FOLLOW-UP VISIT: CPT | Performed by: ORTHOPAEDIC SURGERY

## 2021-12-08 ENCOUNTER — APPOINTMENT (OUTPATIENT)
Dept: RADIOLOGY | Facility: AMBULARY SURGERY CENTER | Age: 71
End: 2021-12-08
Attending: ORTHOPAEDIC SURGERY
Payer: MEDICARE

## 2021-12-08 ENCOUNTER — OFFICE VISIT (OUTPATIENT)
Dept: OBGYN CLINIC | Facility: CLINIC | Age: 71
End: 2021-12-08

## 2021-12-08 VITALS
SYSTOLIC BLOOD PRESSURE: 140 MMHG | HEART RATE: 61 BPM | WEIGHT: 175 LBS | DIASTOLIC BLOOD PRESSURE: 80 MMHG | BODY MASS INDEX: 33.04 KG/M2 | HEIGHT: 61 IN

## 2021-12-08 DIAGNOSIS — M19.171 POST-TRAUMATIC ARTHRITIS OF ANKLE, RIGHT: Primary | ICD-10-CM

## 2021-12-08 DIAGNOSIS — M19.171 POST-TRAUMATIC ARTHRITIS OF ANKLE, RIGHT: ICD-10-CM

## 2021-12-08 PROCEDURE — 73610 X-RAY EXAM OF ANKLE: CPT

## 2021-12-08 PROCEDURE — 99024 POSTOP FOLLOW-UP VISIT: CPT | Performed by: ORTHOPAEDIC SURGERY

## 2022-03-08 ENCOUNTER — OFFICE VISIT (OUTPATIENT)
Dept: OBGYN CLINIC | Facility: CLINIC | Age: 72
End: 2022-03-08
Payer: MEDICARE

## 2022-03-08 ENCOUNTER — APPOINTMENT (OUTPATIENT)
Dept: RADIOLOGY | Facility: AMBULARY SURGERY CENTER | Age: 72
End: 2022-03-08
Attending: ORTHOPAEDIC SURGERY
Payer: MEDICARE

## 2022-03-08 VITALS
HEART RATE: 64 BPM | BODY MASS INDEX: 33.04 KG/M2 | DIASTOLIC BLOOD PRESSURE: 80 MMHG | HEIGHT: 61 IN | WEIGHT: 175 LBS | SYSTOLIC BLOOD PRESSURE: 135 MMHG

## 2022-03-08 DIAGNOSIS — M19.171 POST-TRAUMATIC ARTHRITIS OF ANKLE, RIGHT: Primary | ICD-10-CM

## 2022-03-08 DIAGNOSIS — R26.89 ABNORMALITY OF GAIT DUE TO IMPAIRMENT OF BALANCE: ICD-10-CM

## 2022-03-08 DIAGNOSIS — M19.171 POST-TRAUMATIC ARTHRITIS OF ANKLE, RIGHT: ICD-10-CM

## 2022-03-08 PROCEDURE — 99213 OFFICE O/P EST LOW 20 MIN: CPT | Performed by: ORTHOPAEDIC SURGERY

## 2022-03-08 PROCEDURE — 73610 X-RAY EXAM OF ANKLE: CPT

## 2022-03-08 NOTE — PATIENT INSTRUCTIONS
Total Ankle Replacement Care    -No impact activity (No running, no jumping, no mogul skiing, etc )    -Wear orthotics in all shoes at all times if prescribed  If no orthotics necessary, supportive shoes at all times  *    -Call our office for a prescription for antibiotics prior to any dental work/procedure    -Keep your follow-up visits  Total ankle replacements need to be monitored at regular intervals to catch any potential issues early     -Use compression stockings as needed to help control swelling    -Do not ignore sudden onset redness or swelling in the ankle  Come in to the clinic for evaluation right away if you have sudden onset redness or swelling in the ankle different from your typical swelling        [employer], [employer] Balance, [employer] are good brands but I recommend going to a dedicate shoe store (not [employer] or [employer] ) At these types of stores, they have experts that can fit you for shoes appropriate for your foot problem  [facility]  [address] [facility], [address] [facility] [address], [address] [facility]  [address]     [facility]  [address], [facility], [address] [place] [address]

## 2022-03-08 NOTE — PROGRESS NOTES
GILL Witt  Attending, Orthopaedic Surgery  [facility] [address] [facility] [address] [facility]      ORTHOPAEDIC FOOT AND ANKLE CLINIC VISIT     Assessment:     Encounter Diagnosis   Name Primary?  Post-traumatic arthritis of ankle, right Yes         5 months and 23 days out from right  [place] Total ankle replacement, medial deltoid release, prophylactic fixation of medial malleolus, 9/19/21       Plan:   · The patient verbalized understanding of exam findings and treatment plan  We engaged in the shared decision-making process and treatment options were discussed at length with the patient  Surgical and conservative management discussed today along with risks and benefits  · She is very happy with his total ankle replacement and is now walking  without a limp  She does have numbness of her toes which will continue to Improve  · Continue with proper shoe wear with orthotics if prescribed  · Activity to tolerance  · PT was prescribed for gait imbalance  · Avoid impact activity  · Xrays today demonstrate well aligned total ankle replacement without signs of hardware failure or loosening   Return in about  6 months for repeat evaluation      History of Present Illness:   Chief Complaint:   Chief Complaint   Patient presents with    Right Ankle - Follow-up     Adina Huntley is a 73 y o  female who is being seen in follow-up for right post traumatic ankle arthritis, now 5 months 23 days s/p TAA  When we last met we recommended proper shoe wear, gradually return to activity to tolerance  Pain has 100% improved  Mild residual pain is present with numbness of the toes and pain of the achilles        Pain/symptom timing:  Not longer painful during the day  Pain/symptom context:  Able to tolerate activities and work  Pain/symptom modifying factors:  Returning to [employer] associated signs/symptoms: none    Prior treatment   · NSAIDsYes   · Injections No   · Bracing/Orthotics Yes · Physical Therapy No     Orthopedic Surgical History:   See Below    Past Medical, Surgical and Social History:  Past Medical History:    Past Medical History:   Diagnosis Date    Arthritis     Colon polyps     Depression     Disease of thyroid gland      Past Surgical History:    Past Surgical History:   Procedure Laterality Date    COLONOSCOPY      COLONOSCOPY N/A 12/13/2016    Procedure: COLONOSCOPY;  Surgeon: Leona Puga MD;  Location: [facility] GI LAB;   Service:     FOOT SURGERY Left     JOINT REPLACEMENT      total right hip    [place] ARTHROPLASTY ANKLE JOINT Right 9/13/2021    Procedure: ARTHROPLASTY ANKLE;  Surgeon: Karena Werner MD;  Location:  MAIN OR;  Service: Orthopedics     [address], W/BX Bilateral 7/21/2016    Procedure: LAPAROSCOPIC SALPINGO-OOPHORECTOMY / RESECTION OF TORSED RIGHT PELVIC MASS;  Surgeon: Dave Ray MD;  Location:  MAIN OR;  Service: Gynecology Oncology    SALPINGOOPHORECTOMY      TOTAL HIP ARTHROPLASTY Right     TUBAL LIGATION       Family History:   Family History   Problem Relation Age of Onset    No Known Problems Mother     Cancer Father      Social History:    Social History     Socioeconomic History    Marital status: /Civil Union     Spouse name: Not on file    Number of children: Not on file    Years of education: Not on file    Highest education level: Not on file   Occupational History    Not on file   Tobacco Use    Smoking status: Former Smoker     Packs/day: 0 20     Years: 30 00     Pack years: 6 00     Types: Cigarettes     Quit date: 7/20/2006     Years since quitting: 15 6    Smokeless tobacco: Never Used   Vaping Use    Vaping Use: Never used   Substance and Sexual Activity    Alcohol use: Yes     Comment: 1 drink/ week    Drug use: Yes     Types: Marijuana    Sexual activity: Not on file   Other Topics Concern    Not on file   Social History Narrative    Not on file     Social Determinants of Health Financial Resource Strain: Not on file   Food Insecurity: Not on file   Transportation Needs: Not on file   Physical Activity: Not on file   Stress: Not on file   Social Connections: Not on file   Intimate Partner Violence: Not on file   Housing Stability: Not on file     Current Medications:   Current Outpatient Medications on File Prior to Visit   Medication Sig Dispense Refill    buPROPion HCl (WELLBUTRIN PO) Take 150 mg by mouth daily       Cholecalciferol (VITAMIN D-3) 1000 UNITS CAPS Take 3,000 Units by mouth daily       levothyroxine 50 mcg tablet Take 50 mcg by mouth daily   Multiple Vitamins-Minerals (MULTIVITAMIN ADULT) TABS Take by mouth      aspirin (ECOTRIN) 325 mg EC tablet Take 1 tablet (325 mg total) by mouth 2 (two) times a day (Patient not taking: Reported on 3/8/2022 ) 84 tablet 0    ondansetron (ZOFRAN) 4 mg tablet Take 1 tablet (4 mg total) by mouth every 8 (eight) hours as needed for nausea or vomiting (Patient not taking: Reported on 10/1/2021) 20 tablet 0    oxyCODONE (Roxicodone) 5 immediate release tablet Take 1 tablet (5 mg total) by mouth every 4 (four) hours as needed for moderate pain for up to 12 dosesMax Daily Amount: 30 mg (Patient not taking: Reported on 12/8/2021 ) 12 tablet 0     No current facility-administered medications on file prior to visit       Allergies: No Known Allergies      Review of Systems:  General- denies fever/chills  HEENT- denies hearing loss or sore throat  Eyes- denies eye pain or visual disturbances, denies red eyes  Respiratory- denies cough or SOB  Cardio- denies chest pain or palpitations  GI- denies abdominal pain  Endocrine- denies urinary frequency  Urinary- denies pain with urination  Musculoskeletal- Negative except noted above  Skin- denies rashes or wounds  Neurological- denies dizziness or headache  Psychiatric- denies anxiety or difficulty concentrating    Physical Exam:   /80 (BP Location: Left arm, Patient Position: Sitting, Cuff Size: Adult)   Pulse 64   Ht 5' 1" (1 549 m)   Wt 79 4 kg (175 lb)   BMI 33 07 kg/m²   General/Constitutional: No apparent distress: well-nourished and well developed  Eyes: normal ocular motion  Lymphatic: No appreciable lymphadenopathy  Respiratory: Non-labored breathing  Vascular: No edema, swelling or tenderness, except as noted in detailed exam   Integumentary: No impressive skin lesions present, except as noted in detailed exam   Neuro: No ataxia or tremors noted  Psych: Normal mood and affect, oriented to person, place and time  Appropriate affect  Musculoskeletal: Normal, except as noted in detailed exam and in HPI  Examination    right    Gait Normal   Musculoskeletal  No ttp    Skin Mild swelling ankle Well-healed incisions  Nails Normal    Range of Motion  20 degrees dorsiflexion, 35 degrees plantarflexion  Subtalar motion: 15 degrees eversion, 15 degrees inversion    Stability Stable    Muscle Strength 5/5 tibialis anterior  5/5 gastrocnemius-soleus  5/5 posterior tibialis  5/5 peroneal/eversion strength  5/5 EHL  5/5 FHL    Neurologic Normal    Sensation Intact to light touch throughout sural, saphenous, superficial peroneal, deep peroneal and medial/lateral plantar nerve distributions  Randolph-Arthur 5 07 filament (10g) testing deferred  Cardiovascular Brisk capillary refill < 2 seconds,intact DP and PT pulses    Special Tests None      Imaging Studies:   6 views of the right ankle were obtained, reviewed and interpreted independently which demonstrate hardware intact and aligned, no sign of loosening  No cyst formation, no stress fractures noted, minimal heterotopic ossification  Reviewed by me personally  Newt Adas Lachman, MD  Foot & Ankle Surgery   Department [address]      I personally performed the service  Newt Adas Lachman, MD    Scribe Attestation    I,:  Danni Yun MA am acting as a scribe while in the presence of the attending physician :       I,:  Karena Werner MD personally performed the services described in this documentation    as scribed in my presence :

## 2022-09-06 ENCOUNTER — OFFICE VISIT (OUTPATIENT)
Dept: OBGYN CLINIC | Facility: CLINIC | Age: 72
End: 2022-09-06
Payer: MEDICARE

## 2022-09-06 ENCOUNTER — APPOINTMENT (OUTPATIENT)
Dept: RADIOLOGY | Facility: AMBULARY SURGERY CENTER | Age: 72
End: 2022-09-06
Attending: ORTHOPAEDIC SURGERY
Payer: MEDICARE

## 2022-09-06 VITALS — WEIGHT: 175 LBS | HEIGHT: 61 IN | BODY MASS INDEX: 33.04 KG/M2

## 2022-09-06 DIAGNOSIS — M19.171 POST-TRAUMATIC ARTHRITIS OF ANKLE, RIGHT: ICD-10-CM

## 2022-09-06 DIAGNOSIS — M19.171 POST-TRAUMATIC ARTHRITIS OF ANKLE, RIGHT: Primary | ICD-10-CM

## 2022-09-06 PROCEDURE — 73610 X-RAY EXAM OF ANKLE: CPT

## 2022-09-06 PROCEDURE — 99213 OFFICE O/P EST LOW 20 MIN: CPT | Performed by: ORTHOPAEDIC SURGERY

## 2022-09-06 RX ORDER — GABAPENTIN 300 MG/1
300 CAPSULE ORAL
Qty: 30 CAPSULE | Refills: 1 | Status: SHIPPED | OUTPATIENT
Start: 2022-09-06

## 2022-09-06 NOTE — PATIENT INSTRUCTIONS
[employer]---> shop orthotics---> flat feet--> click the filters on the left "maximum thickness", " womens", "memory foam", pick between  and

## 2022-09-06 NOTE — PROGRESS NOTES
GILL Leslie  Attending, Orthopaedic Surgery  Foot and [address] [facility] [address] [facility]      ORTHOPAEDIC FOOT AND ANKLE CLINIC VISIT     Assessment:     Encounter Diagnosis   Name Primary?  Post-traumatic arthritis of ankle, right Yes       12month(s) s/p Right [place] ankle replacement, medial deltoid release, prophylactic fixation of medial malleolus       Plan:   · The patient verbalized understanding of exam findings and treatment plan  We engaged in the shared decision-making process and treatment options were discussed at length with the patient  Surgical and conservative management discussed today along with risks and benefits  · The patient is doing very well at this point and is comfortable with her ADLs in a supportive sneaker  · Her Xray shows the ankle replacement is well fixed with no signs of loosening or hardware failure  · We reinforced that with any dental work, the patient would need antibiotic prophylaxis with 1000mg of amoxicillin 1 hour prior to any procedure  · No impact activities as this is a lifetime activity modification after ankle replacement  · She complains of nerve pain in her toes that she states originated from her cast being too tight in that area after it broke while walking on it surgery  She received an CSI by Dr Paulette Urban on 03/2022 that did not relieve her pain  She agreed to trial gabapentin  She will call the office in 10 days to let us know how it is working  · We discussed orthotic use for her complaints of right foot pronation  This was not evident on xray  Our recommendation for an orthotic purchase was placed in her patient instructions  · See back in 12 months for her 24 month follow-up with Xrays        History of Present Illness:   Chief Complaint:   Chief Complaint   Patient presents with    Right Ankle - Post-op       Joana Walker is a 81 y o  female  who is being seen in follow-up for right post traumatic ankle arthritis, now 15 months s/p TAA Ankle replacement  When we last saw she we recommended return to ADLs  She states she nerve inflammation over the big toe from her cast placement  She saw podiatry in 03/2022 for a joint injection but states it did not improve her pain  She states that her ankle is feeling good but has to modify activity due to the nerve pain in her toe  She has mild tenderness over the medial ankle and her achilles pain she reported last visit has improved  Pain has completely improved  No residual pain in the ankle  Pain/symptom timing:  Worse during the day when active  Pain/symptom context:  Worse with activites and work  Pain/symptom modifying factors:  Rest makes better, activities make worse  Pain/symptom associated signs/symptoms: none    Prior treatment   · NSAIDsNo   · Injections No   · Bracing/Orthotics Yes    · Physical Therapy No     Orthopedic Surgical History:   See below    Past Medical, Surgical and Social History:  Past Medical History:  has a past medical history of Ambulates with cane, Anesthesia, Anxiety, Asthma, Chronic pain disorder, Contact dermatitis, DDD (degenerative disc disease), lumbar, Detached retina, Dizziness, Dry eyes, bilateral, Eczema, GERD (gastroesophageal reflux disease), H/O bilateral breast implants, Hepatitis-C, Hiatal hernia, History of recent fall, History of total right knee replacement, [place] (hard of hearing), Left knee pain, Left shoulder pain, OA (osteoarthritis) of knee, OAB (overactive bladder), PONV (postoperative nausea and vomiting), Rash, Risk for falls, RLS (restless legs syndrome), Shortness of breath, Spinal stenosis, Uses walker, and Wears glasses  Problem List: does not have any pertinent problems on file  Past Surgical History:  has a past surgical history that includes Joint replacement; Colonoscopy; Esophagogastroduodenoscopy; Cataract extraction (Bilateral); Pubovaginal sling; Appendectomy; Cholecystectomy; Hysterectomy; Neck mass excision;  Foot surgery (Bilateral); Knee arthroscopy (Right); Tonsillectomy; BREAST IMPLANT (Bilateral); pr open tx trimalleolar ankle fx w fix pst lip (Right, 7/8/2018); pr removal deep implant (Right, 2/18/2019); pr arthroplasty ankle joint (Right, 8/5/2019); and Achilles tendon surgery (N/A, 8/5/2019)  Family History: family history includes Cancer in her brother; Heart attack in her brother; Heart defect in her sister  Social History:  reports that she quit smoking about 4 months ago  Her smoking use included cigarettes  She has a 1 50 pack-year smoking history  She has never used smokeless tobacco  She reports that she drinks alcohol  She reports that she does not use drugs  Current Medications: has a current medication list which includes the following prescription(s): acetaminophen, albuterol, alprazolam, aspirin, loratadine, ondansetron, pantoprazole, and pramipexole  Allergies: has No Known Allergies  Review of Systems:  General- denies fever/chills  HEENT- denies hearing loss or sore throat  Eyes- denies eye pain or visual disturbances, denies red eyes  Respiratory- denies cough or SOB  Cardio- denies chest pain or palpitations  GI- denies abdominal pain  Endocrine- denies urinary frequency  Urinary- denies pain with urination  Musculoskeletal- Negative except noted above  Skin- denies rashes or wounds  Neurological- denies dizziness or headache  Psychiatric- denies anxiety or difficulty concentrating    Physical Exam:   Ht 5' 1" (1 549 m)   BMI 41 15 kg/m²   General/Constitutional: No apparent distress: well-nourished and well developed  Eyes: normal ocular motion  Lymphatic: No appreciable lymphadenopathy  Respiratory: Non-labored breathing  Vascular: No edema, swelling or tenderness, except as noted in detailed exam   Integumentary: No impressive skin lesions present, except as noted in detailed exam   Neuro: No ataxia or tremors noted  Psych: Normal mood and affect, oriented to person, place and time  Appropriate affect  Musculoskeletal: Normal, except as noted in detailed exam and in HPI  Examination    Right    Gait Normal   Musculoskeletal NTTP    Skin Normal   Well-healed incisions  Nails Normal    Range of Motion  20 degrees dorsiflexion, 35 degrees plantarflexion  Subtalar motion: 15 degree inversion & eversion    Stability Stable    Muscle Strength 5/5 tibialis anterior  5/5 gastrocnemius-soleus  5/5 posterior tibialis  5/5 peroneal/eversion strength  5/5 EHL  5/5 FHL    Neurologic Normal    Sensation Intact to light touch throughout sural, saphenous, superficial peroneal, deep peroneal and medial/lateral plantar nerve distributions  [place]-Arthur 5 07 filament (10g) testing deferred  Cardiovascular Brisk capillary refill < 2 seconds,intact DP and PT pulses    Special Tests None      Imaging Studies:   6 views of the right ankle weightbearing were obtained, reviewed and interpreted independently which demonstrate well aligned, well fixed [place] total ankle replacement without signs of hardware failure or loosening  Reviewed by me personally  Scribe Attestation    I,:  Kaylee Teran PA-C am acting as a scribe while in the presence of the attending physician :       I,:  Juan Daniel Lyons MD personally performed the services described in this documentation    as scribed in my presence :             Dory Cross Lachman, MD  Foot & Ankle Surgery   Department of [address] [place] [address]      I personally performed the service  Dory Cross Lachman, MD

## 2022-09-15 ENCOUNTER — TELEPHONE (OUTPATIENT)
Dept: OBGYN CLINIC | Facility: HOSPITAL | Age: 72
End: 2022-09-15

## 2022-09-15 NOTE — TELEPHONE ENCOUNTER
Dr Cooney ,   Patient called she is feeling some relief after starting gabapentin  She is taking it at bedtime everyday and she needs the instructions again to wean off of it       # [phone]

## 2022-09-16 NOTE — TELEPHONE ENCOUNTER
Spoke to patient and advised of the above comment by Eliseo Avila  Patient verbalized understanding and will call back with any questions or concerns  Thanks!

## 2022-11-07 DIAGNOSIS — M19.171 POST-TRAUMATIC ARTHRITIS OF ANKLE, RIGHT: Primary | ICD-10-CM

## 2022-11-07 RX ORDER — GABAPENTIN 300 MG/1
300 CAPSULE ORAL
Qty: 30 CAPSULE | Refills: 0 | Status: SHIPPED | OUTPATIENT
Start: 2022-11-07

## 2023-06-28 ENCOUNTER — TELEPHONE (OUTPATIENT)
Dept: OBGYN CLINIC | Facility: HOSPITAL | Age: 73
End: 2023-06-28

## 2023-06-29 NOTE — TELEPHONE ENCOUNTER
Attempted to call pt to relay RAINA Calvert's msg; however, unable to LM as VM is full  Will try again later

## 2023-06-30 NOTE — TELEPHONE ENCOUNTER
Called and spoke w/pt and she said she was just thinking because the pain is still there  Pain level 2-3 on right big toe area  She said we could disregard this and she would discuss it with Dr Efra Dai at her next appt in Sept   Thank you

## 2023-09-06 ENCOUNTER — OFFICE VISIT (OUTPATIENT)
Dept: OBGYN CLINIC | Facility: CLINIC | Age: 73
End: 2023-09-06
Payer: MEDICARE

## 2023-09-06 ENCOUNTER — APPOINTMENT (OUTPATIENT)
Dept: RADIOLOGY | Facility: AMBULARY SURGERY CENTER | Age: 73
End: 2023-09-06
Attending: ORTHOPAEDIC SURGERY
Payer: MEDICARE

## 2023-09-06 VITALS
SYSTOLIC BLOOD PRESSURE: 126 MMHG | BODY MASS INDEX: 33.04 KG/M2 | DIASTOLIC BLOOD PRESSURE: 76 MMHG | HEART RATE: 57 BPM | WEIGHT: 175 LBS | HEIGHT: 61 IN

## 2023-09-06 DIAGNOSIS — M19.171 POST-TRAUMATIC ARTHRITIS OF ANKLE, RIGHT: ICD-10-CM

## 2023-09-06 DIAGNOSIS — M19.171 POST-TRAUMATIC ARTHRITIS OF ANKLE, RIGHT: Primary | ICD-10-CM

## 2023-09-06 PROCEDURE — 99213 OFFICE O/P EST LOW 20 MIN: CPT | Performed by: ORTHOPAEDIC SURGERY

## 2023-09-06 PROCEDURE — 73610 X-RAY EXAM OF ANKLE: CPT

## 2023-09-06 NOTE — PATIENT INSTRUCTIONS
Total Ankle Replacement Care    -No impact activity (No running, no jumping, no mogul skiing, etc.)    -Wear orthotics in all shoes at all times if prescribed. If no orthotics necessary, supportive shoes at all times. *    -Call our office for a prescription for antibiotics prior to any dental work/procedure    -Keep your follow-up visits. Total ankle replacements need to be monitored at regular intervals to catch any potential issues early.    -Use compression stockings as needed to help control swelling    -Do not ignore sudden onset redness or swelling in the ankle. Come in to the clinic for evaluation right away if you have sudden onset redness or swelling in the ankle different from your typical swelling      [place] [employer], [employer], [employer] are good brands but I recommend going to a dedicate shoe store (not [facility] or \[facility]\"".) At these types of stores, they have experts that can fit you for shoes appropriate for your foot problem. Shoe choice is essential to solving/improving most types of foot pain. Even after a surgery, good shoes are necessary to keep the foot as comfortable as possible. [employer]  [address] [address]  [address] [facility]  [phone]    [place] [address] [address] [facility]  81 [facility]. 100 [facility], [facility] [place]    [employer]  [address] [facility], 1200 [facility]  [phone]    [facility]  [address] [facility] [place], 301 [facility]   [address] [facility], [address]  [phone]    [employer]  [address] [facility], [address]  [address] [facility]  [address] [facility], [address] [facility], [facility], [address] [facility] [address]   [phone]

## 2023-09-06 NOTE — PROGRESS NOTES
Jasmin Layne M.D. Attending, Orthopaedic Surgery  Foot and [address] [facility]      ORTHOPAEDIC FOOT AND ANKLE CLINIC VISIT     Assessment:     Encounter Diagnosis   Name Primary? • Post-traumatic arthritis of ankle, right Yes         2 years out from right  [place] Total ankle replacement       Plan:   · The patient verbalized understanding of exam findings and treatment plan. We engaged in the shared decision-making process and treatment options were discussed at length with the patient. Surgical and conservative management discussed today along with risks and benefits. · She is very happy with her total ankle replacement and is now walking without a limp  · Continue with proper shoe wear with orthotics if prescribed  · Activity to tolerance. · Avoid impact activity  · Xrays today demonstrate well aligned total ankle replacement without signs of hardware failure or loosening  • Return in about  1 years for repeat evaluation      History of Present Illness:   Chief Complaint:   Chief Complaint   Patient presents with   • Right Ankle - Follow-up     Juan Luis Sanchez a 72 y.o. female who is being seen in follow-up for right post traumatic ankle arthritis, now 2 year s/p TAA. When we last met we recommended proper shoe wear, gradually return to activity to tolerance. Pain has 100% improved. No  residual pain present.       Pain/symptom timing:  Not longer painful during the day  Pain/symptom context:  Able to tolerate activities  Pain/symptom modifying factors:  Returning to activities without limitations  Pain/symptom associated signs/symptoms: none    Prior treatment   · NSAIDsYes   · Injections No   · Bracing/Orthotics Yes    · Physical Therapy No     Orthopedic Surgical History:   See below    Past Medical, Surgical and Social History:  Past Medical History:    Past Medical History:   Diagnosis Date   • Arthritis    • Colon polyps    • Depression    • Disease of thyroid gland Past Surgical History:    Past Surgical History:   Procedure Laterality Date   • COLONOSCOPY     • COLONOSCOPY N/A 2016    Procedure: COLONOSCOPY;  Surgeon: Brad Krause MD;  Location: [facility] GI LAB;   Service:    • FOOT SURGERY Left    • JOINT REPLACEMENT      total right hip   • OR ARTHROPLASTY ANKLE Right 2021    Procedure: ARTHROPLASTY ANKLE;  Surgeon: Nell Spencer MD;  Location:  MAIN OR;  Service: Orthopedics   • OR OOPHORECTOMY PRTL/TOT UNI/BI OVARIAN MALIGNANCY Bilateral 2016    Procedure: LAPAROSCOPIC SALPINGO-OOPHORECTOMY / RESECTION OF TORSED RIGHT PELVIC MASS;  Surgeon: Ken Solares MD;  Location:  MAIN OR;  Service: Gynecology Oncology   • SALPINGOOPHORECTOMY     • TOTAL HIP ARTHROPLASTY Right    • TUBAL LIGATION       Family History:   Family History   Problem Relation Age of Onset   • No Known Problems Mother    • Cancer Father      Social History:    Social History     Socioeconomic History   • Marital status: /Civil Union     Spouse name: Not on file   • Number of children: Not on file   • Years of education: Not on file   • Highest education level: Not on file   Occupational History   • Not on file   Tobacco Use   • Smoking status: Former     Packs/day: 0.20     Years: 30.00     Total pack years: 6.00     Types: Cigarettes     Quit date: 2006     Years since quittin.1   • Smokeless tobacco: Never   Vaping Use   • Vaping Use: Never used   Substance and Sexual Activity   • Alcohol use: Yes     Comment: 1 drink/ week   • Drug use: Yes     Types: Marijuana   • Sexual activity: Not on file   Other Topics Concern   • Not on file   Social History Narrative   • Not on file     Social Determinants of Health     Financial Resource Strain: Not on file   Food Insecurity: Not on file   Transportation Needs: Not on file   Physical Activity: Not on file   Stress: Not on file   Social Connections: Not on file   Intimate Partner Violence: Not on file   Housing Stability: Not on file     Current Medications:   Current Outpatient Medications on File Prior to Visit   Medication Sig Dispense Refill   • buPROPion HCl (WELLBUTRIN PO) Take 150 mg by mouth daily      • Cholecalciferol (VITAMIN D-3) 1000 UNITS CAPS Take 3,000 Units by mouth daily      • gabapentin (Neurontin) 300 mg capsule Take 1 capsule (300 mg total) by mouth daily at bedtime 30 capsule 1   • gabapentin (Neurontin) 300 mg capsule Take 1 capsule (300 mg total) by mouth daily at bedtime 30 capsule 0   • levothyroxine 50 mcg tablet Take 50 mcg by mouth daily. • Multiple Vitamins-Minerals (MULTIVITAMIN ADULT) TABS Take by mouth     • aspirin (ECOTRIN) 325 mg EC tablet Take 1 tablet (325 mg total) by mouth 2 (two) times a day (Patient not taking: Reported on 3/8/2022) 84 tablet 0   • ondansetron (ZOFRAN) 4 mg tablet Take 1 tablet (4 mg total) by mouth every 8 (eight) hours as needed for nausea or vomiting (Patient not taking: Reported on 10/1/2021) 20 tablet 0   • oxyCODONE (Roxicodone) 5 immediate release tablet Take 1 tablet (5 mg total) by mouth every 4 (four) hours as needed for moderate pain for up to 12 dosesMax Daily Amount: 30 mg (Patient not taking: Reported on 12/8/2021) 12 tablet 0     No current facility-administered medications on file prior to visit.      Allergies: No Known Allergies      Review of Systems:  General- denies fever/chills  HEENT- denies hearing loss or sore throat  Eyes- denies eye pain or visual disturbances, denies red eyes  Respiratory- denies cough or SOB  Cardio- denies chest pain or palpitations  GI- denies abdominal pain  Endocrine- denies urinary frequency  Urinary- denies pain with urination  Musculoskeletal- Negative except noted above  Skin- denies rashes or wounds  Neurological- denies dizziness or headache  Psychiatric- denies anxiety or difficulty concentrating    Physical Exam:   /76   Pulse 57   Ht 5' 1" (1.549 m)   Wt 79.4 kg (175 lb)   BMI 33.07 kg/m² General/Constitutional: No apparent distress: well-nourished and well developed. Eyes: normal ocular motion  Lymphatic: No appreciable lymphadenopathy  Respiratory: Non-labored breathing  Vascular: No edema, swelling or tenderness, except as noted in detailed exam.  Integumentary: No impressive skin lesions present, except as noted in detailed exam.  Neuro: No ataxia or tremors noted  Psych: Normal mood and affect, oriented to person, place and time. Appropriate affect. Musculoskeletal: Normal, except as noted in detailed exam and in HPI. Examination    right    Gait Normal   Musculoskeletal  No ttp    Skin Mild swelling ankle Well-healed incisions. Nails Normal    Range of Motion  25 degrees dorsiflexion, 40 degrees plantarflexion  Subtalar motion: 15 degrees eversion, 15 degrees inversion    Stability Stable    Muscle Strength 5/5 tibialis anterior  5/5 gastrocnemius-soleus  5/5 posterior tibialis  5/5 peroneal/eversion strength  5/5 EHL  5/5 FHL    Neurologic Normal    Sensation Intact to light touch throughout sural, saphenous, superficial peroneal, deep peroneal and medial/lateral plantar nerve distributions. [place]-Arthur 5.07 filament (10g) testing deferred. Cardiovascular Brisk capillary refill < 2 seconds,intact DP and PT pulses    Special Tests None      Imaging Studies:   6 views of the right ankle were obtained, reviewed and interpreted independently which demonstrate hardware intact and aligned, no sign of loosening. No cyst formation, no stress fractures noted, minimal heterotopic ossification. Reviewed by me personally. Lexie Bar. Lachman, MD  Foot & Ankle Surgery   Department of [address] [place] [address] personally performed the service. Lexie Bar. Lachman, MD

## 2024-09-04 ENCOUNTER — OFFICE VISIT (OUTPATIENT)
Dept: OBGYN CLINIC | Facility: CLINIC | Age: 74
End: 2024-09-04
Payer: MEDICARE

## 2024-09-04 ENCOUNTER — APPOINTMENT (OUTPATIENT)
Dept: RADIOLOGY | Facility: AMBULARY SURGERY CENTER | Age: 74
End: 2024-09-04
Attending: ORTHOPAEDIC SURGERY
Payer: MEDICARE

## 2024-09-04 VITALS
DIASTOLIC BLOOD PRESSURE: 76 MMHG | BODY MASS INDEX: 32.85 KG/M2 | WEIGHT: 174 LBS | SYSTOLIC BLOOD PRESSURE: 126 MMHG | HEART RATE: 57 BPM | HEIGHT: 61 IN

## 2024-09-04 DIAGNOSIS — M19.171 POST-TRAUMATIC ARTHRITIS OF ANKLE, RIGHT: Primary | ICD-10-CM

## 2024-09-04 DIAGNOSIS — Z01.89 ENCOUNTER FOR LOWER EXTREMITY COMPARISON IMAGING STUDY: ICD-10-CM

## 2024-09-04 DIAGNOSIS — M19.171 POST-TRAUMATIC ARTHRITIS OF ANKLE, RIGHT: ICD-10-CM

## 2024-09-04 PROCEDURE — 73600 X-RAY EXAM OF ANKLE: CPT

## 2024-09-04 PROCEDURE — 99213 OFFICE O/P EST LOW 20 MIN: CPT | Performed by: ORTHOPAEDIC SURGERY

## 2024-09-04 PROCEDURE — 73610 X-RAY EXAM OF ANKLE: CPT

## 2024-09-04 RX ORDER — AMOXICILLIN 250 MG/1
1000 CAPSULE ORAL EVERY 8 HOURS SCHEDULED
Qty: 4 CAPSULE | Refills: 3 | Status: SHIPPED | OUTPATIENT
Start: 2024-09-04 | End: 2024-09-05

## 2024-09-04 NOTE — PROGRESS NOTES
"      James R Lachman, M.D.  Attending, Orthopaedic Surgery  Foot and Ankle  [facility]      ORTHOPAEDIC FOOT AND ANKLE CLINIC VISIT     Assessment:     Encounter Diagnoses   Name Primary?    Post-traumatic arthritis of ankle, right Yes    Encounter for lower extremity comparison imaging study      3 years out from right  [place] Total ankle replacement  DOS: 9/13/2021     Plan:   The patient verbalized understanding of exam findings and treatment plan. We engaged in the shared decision-making process and treatment options were discussed at length with the patient. Surgical and conservative management discussed today along with risks and benefits.  She is very happy with this total ankle replacement and is now walking pain free  Continue with proper shoe wear with orthotics if prescribed- recommended medial pitch orthotic today   Activity to tolerance.  Avoid impact activity  Xrays today demonstrate well aligned total ankle replacement without signs of hardware failure or loosening. No evidence of arch collapse  Return in about 3 months - orthotic check       History of Present Illness:   Chief Complaint:   Chief Complaint   Patient presents with    Right Ankle - Follow-up     Janett Hernandes is a 74 y.o. adult who is being seen in follow-up for right post traumatic ankle arthritis, now 3 year s/p TAA. When we last met we recommended proper shoe wear, gradually return to activity to tolerance.  Pain has 100% improved. No  residual pain present. Feels as if she is having \"pronation\" of right ankle sometimes when walking.      Pain/symptom timing:  Not longer painful during the day  Pain/symptom context:  Able to tolerate activities and work  Pain/symptom modifying factors:  Returning to activities   Pain/symptom associated signs/symptoms: none    Prior treatment   NSAIDsYes   Injections No   Bracing/Orthotics Yes    Physical Therapy No     Orthopedic Surgical History:   See below     Past " Medical, Surgical and Social History:  Past Medical History:    Past Medical History:   Diagnosis Date    Arthritis     Colon polyps     Depression     Disease of thyroid gland      Past Surgical History:    Past Surgical History:   Procedure Laterality Date    COLONOSCOPY      COLONOSCOPY N/A 2016    Procedure: COLONOSCOPY;  Surgeon: Kunal Carranza MD;  Location: [facility] GI LAB;  Service:     FOOT SURGERY Left     JOINT REPLACEMENT      total right hip    [place] ARTHROPLASTY ANKLE Right 2021    Procedure: ARTHROPLASTY ANKLE;  Surgeon: James R Lachman, MD;  Location:  MAIN OR;  Service: Orthopedics    MN OOPHORECTOMY PRTL/TOT UNI/BI OVARIAN MALIGNANCY Bilateral 2016    Procedure: LAPAROSCOPIC SALPINGO-OOPHORECTOMY / RESECTION OF TORSED RIGHT PELVIC MASS;  Surgeon: Demetrius Hankins MD;  Location:  MAIN OR;  Service: Gynecology Oncology    SALPINGOOPHORECTOMY      TOTAL HIP ARTHROPLASTY Right     TUBAL LIGATION       Family History:   Family History   Problem Relation Age of Onset    No Known Problems Mother     Cancer Father      Social History:    Social History     Socioeconomic History    Marital status: /Civil Union     Spouse name: Not on file    Number of children: Not on file    Years of education: Not on file    Highest education level: Not on file   Occupational History    Not on file   Tobacco Use    Smoking status: Former     Current packs/day: 0.00     Average packs/day: 0.2 packs/day for 30.0 years (6.0 ttl pk-yrs)     Types: Cigarettes     Start date: 1976     Quit date: 2006     Years since quittin.1    Smokeless tobacco: Never   Vaping Use    Vaping status: Never Used   Substance and Sexual Activity    Alcohol use: Yes     Comment: 1 drink/ week    Drug use: Yes     Types: Marijuana    Sexual activity: Not on file   Other Topics Concern    Not on file   Social History Narrative    Not on file     Social Determinants of Health     Financial Resource Strain: Not on  file   Food Insecurity: Not on file   Transportation Needs: Not on file   Physical Activity: Not on file   Stress: Not on file   Social Connections: Not on file   Intimate Partner Violence: Not on file   Housing Stability: Not on file     Current Medications:   Current Outpatient Medications on File Prior to Visit   Medication Sig Dispense Refill    buPROPion HCl (WELLBUTRIN PO) Take 150 mg by mouth daily       Cholecalciferol (VITAMIN D-3) 1000 UNITS CAPS Take 3,000 Units by mouth daily       gabapentin (Neurontin) 300 mg capsule Take 1 capsule (300 mg total) by mouth daily at bedtime 30 capsule 1    gabapentin (Neurontin) 300 mg capsule Take 1 capsule (300 mg total) by mouth daily at bedtime 30 capsule 0    levothyroxine 50 mcg tablet Take 50 mcg by mouth daily.      Multiple Vitamins-Minerals (MULTIVITAMIN ADULT) TABS Take by mouth      aspirin (ECOTRIN) 325 mg EC tablet Take 1 tablet (325 mg total) by mouth 2 (two) times a day (Patient not taking: Reported on 3/8/2022) 84 tablet 0    ondansetron (ZOFRAN) 4 mg tablet Take 1 tablet (4 mg total) by mouth every 8 (eight) hours as needed for nausea or vomiting (Patient not taking: Reported on 10/1/2021) 20 tablet 0    oxyCODONE (Roxicodone) 5 immediate release tablet Take 1 tablet (5 mg total) by mouth every 4 (four) hours as needed for moderate pain for up to 12 dosesMax Daily Amount: 30 mg (Patient not taking: Reported on 12/8/2021) 12 tablet 0     No current facility-administered medications on file prior to visit.     Allergies: No Known Allergies      Review of Systems:  General- denies fever/chills  HEENT- denies hearing loss or sore throat  Eyes- denies eye pain or visual disturbances, denies red eyes  Respiratory- denies cough or SOB  Cardio- denies chest pain or palpitations  GI- denies abdominal pain  Endocrine- denies urinary frequency  Urinary- denies pain with urination  Musculoskeletal- Negative except noted above  Skin- denies rashes or  "wounds  Neurological- denies dizziness or headache  Psychiatric- denies anxiety or difficulty concentrating    Physical Exam:   /76   Pulse 57   Ht 5' 1\" (1.549 m)   Wt 78.9 kg (174 lb)   BMI 32.88 kg/m²   General/Constitutional: No apparent distress: well-nourished and well developed.  Eyes: normal ocular motion  Lymphatic: No appreciable lymphadenopathy  Respiratory: Non-labored breathing  Vascular: No edema, swelling or tenderness, except as noted in detailed exam.  Integumentary: No impressive skin lesions present, except as noted in detailed exam.  Neuro: No ataxia or tremors noted  Psych: Normal mood and affect, oriented to person, place and time. Appropriate affect.  Musculoskeletal: Normal, except as noted in detailed exam and in HPI.    Examination    right    Gait Normal   Musculoskeletal  No ttp    Skin Mild swelling ankle Well-healed incisions.    Nails Normal    Range of Motion  20 degrees dorsiflexion, 30 degrees plantarflexion  Subtalar motion: 15 degrees eversion, 15 degrees inversion    Stability Stable    Muscle Strength 5/5 tibialis anterior  5/5 gastrocnemius-soleus  5/5 posterior tibialis  5/5 peroneal/eversion strength  5/5 EHL  5/5 FHL    Neurologic Normal    Sensation Intact to light touch throughout sural, saphenous, superficial peroneal, deep peroneal and medial/lateral plantar nerve distributions. 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    Cardiovascular Brisk capillary refill < 2 seconds,intact DP and PT pulses    Special Tests None      Imaging Studies:   6 views of the right ankle were obtained, reviewed and interpreted independently which demonstrate hardware intact and aligned, no sign of loosening. No cyst formation, no stress fractures noted, minimal heterotopic ossification. Reviewed by me personally.    Scribe Attestation      I,:  Isabelle Franklin PA-C am acting as a scribe " while in the presence of the attending physician.:       I,:  James R Lachman, MD personally performed the services described in this documentation    as scribed in my presence.:           James R. Lachman, MD  Foot & Ankle Surgery   Department of Orthopaedic Surgery  [facility]      I personally performed the service.    James R. Lachman, MD

## 2024-09-04 NOTE — PATIENT INSTRUCTIONS
Total Ankle Replacement Care    -No impact activity (No running, no jumping, no mogul skiing, etc.)    -Wear orthotics in all shoes at all times if prescribed. If no orthotics necessary, supportive shoes at all times.*    -Call our office for a prescription for antibiotics prior to any dental work/procedure    -Keep your follow-up visits.  Total ankle replacements need to be monitored at regular intervals to catch any potential issues early.    -Use compression stockings as needed to help control swelling    -Do not ignore sudden onset redness or swelling in the ankle. Come in to the clinic for evaluation right away if you have sudden onset redness or swelling in the ankle different from your typical swelling      [employer], [employer], [employer] are good brands but I recommend going to a dedicate shoe store (not [employer] or [employer].) At these types of stores, they have experts that can fit you for shoes appropriate for your foot problem. Shoe choice is essential to solving/improving most types of foot pain.  Even after a surgery, good shoes are necessary to keep the foot as comfortable as possible.    [employer]  [address] [facility] [address]  [phone]    [employer], [place] [address]  [phone]    [employer]  [address] [place] [address] [phone]    [employer]   [address] [facility]  [phone]    [employer]  [address], [place], [place] [address] [phone]    [employer]  [address] [place] [id number] [phone]    [facility] [employer]   [address] [place] [address]  [phone]    [employer]  [address] [place], [place]  [phone]    "[employer] [address], [address]  [phone]    [employer]  [address] [place] [address], [address], [place] [address] [phone]    How to get orthotic:  [email or web address]  -  "[employer] shop orthotics  - for condition, select flat feet  - filters: \"max thickness\", \"women\", \"memory foam\"  - models are  or     "

## (undated) DEVICE — SYRINGE 30ML LL

## (undated) DEVICE — 2.7MM CANNULATED DRILL BIT/QC 160MM

## (undated) DEVICE — NEEDLE HYPO 22G X 1-1/2 IN

## (undated) DEVICE — CAST PADDING 6IN UNSTERILE

## (undated) DEVICE — TAPE CAST 4IN FIBERGLASS 4YD WHITE

## (undated) DEVICE — BETHLEHEM UNIV MAJ EXT ,KIT: Brand: CARDINAL HEALTH

## (undated) DEVICE — INTENDED FOR TISSUE SEPARATION, AND OTHER PROCEDURES THAT REQUIRE A SHARP SURGICAL BLADE TO PUNCTURE OR CUT.: Brand: BARD-PARKER SAFETY BLADES SIZE 15, STERILE

## (undated) DEVICE — DRAPE C-ARM X-RAY

## (undated) DEVICE — ABDOMINAL PAD: Brand: DERMACEA

## (undated) DEVICE — SUT ETHILON 3-0 PS-1 18 IN 1663H

## (undated) DEVICE — INTENDED FOR TISSUE SEPARATION, AND OTHER PROCEDURES THAT REQUIRE A SHARP SURGICAL BLADE TO PUNCTURE OR CUT.: Brand: BARD-PARKER ® CARBON RIB-BACK BLADES

## (undated) DEVICE — PENCIL ELECTROSURG E-Z CLEAN -0035H

## (undated) DEVICE — CHLORAPREP HI-LITE 26ML ORANGE

## (undated) DEVICE — DRAPE SHEET THREE QUARTER

## (undated) DEVICE — MEDI-VAC YANKAUER SUCTION HANDLE W/STRAIGHT TIP & CONTROL VENT: Brand: CARDINAL HEALTH

## (undated) DEVICE — 1.25MM NON-THREADED GUIDE WIRE 150MM

## (undated) DEVICE — CUFF TOURNIQUET 30 X 4 IN QUICK CONNECT DISP 1BLA

## (undated) DEVICE — BANDAGE, ESMARK LF STR 6"X9' (20/CS): Brand: CYPRESS

## (undated) DEVICE — 3M™ DURAPORE™ SURGICAL TAPE 2 INCHES X 10YARDS (5.0CM X 9.1M) 6ROLLS/CARTON 10CARTONS/CASE 1538-2: Brand: 3M™ DURAPORE™

## (undated) DEVICE — BULB SYRINGE,IRRIGATION WITH PROTECTIVE CAP: Brand: DOVER

## (undated) DEVICE — GAUZE SPONGES,16 PLY: Brand: CURITY

## (undated) DEVICE — GLOVE INDICATOR PI UNDERGLOVE SZ 8 BLUE

## (undated) DEVICE — DRAPE C-ARMOUR

## (undated) DEVICE — DRESSING XEROFORM 5 X 9

## (undated) DEVICE — BLADE OSC 10 X 75 X 1.9MM

## (undated) DEVICE — SUT VICRYL 2-0 CT-2 27 IN J269H

## (undated) DEVICE — GLOVE SRG BIOGEL 8

## (undated) DEVICE — CAST PADDING 6 IN STERILE

## (undated) DEVICE — BLADE RECIP 1 X 50MM 1.20THCK

## (undated) DEVICE — SUT VICRYL 4-0 PS-2 18 IN J496G